# Patient Record
Sex: MALE | Race: WHITE | NOT HISPANIC OR LATINO | Employment: OTHER | URBAN - METROPOLITAN AREA
[De-identification: names, ages, dates, MRNs, and addresses within clinical notes are randomized per-mention and may not be internally consistent; named-entity substitution may affect disease eponyms.]

---

## 2022-05-20 ENCOUNTER — HOSPITAL ENCOUNTER (INPATIENT)
Facility: MEDICAL CENTER | Age: 67
LOS: 4 days | DRG: 552 | End: 2022-05-24
Attending: EMERGENCY MEDICINE | Admitting: SURGERY
Payer: MEDICARE

## 2022-05-20 ENCOUNTER — HOSPITAL ENCOUNTER (OUTPATIENT)
Dept: RADIOLOGY | Facility: MEDICAL CENTER | Age: 67
End: 2022-05-20

## 2022-05-20 DIAGNOSIS — T14.90XA TRAUMA: ICD-10-CM

## 2022-05-20 DIAGNOSIS — S06.0X1A CLOSED HEAD INJURY WITH CONCUSSION, WITH LOSS OF CONSCIOUSNESS OF 30 MINUTES OR LESS, INITIAL ENCOUNTER: ICD-10-CM

## 2022-05-20 DIAGNOSIS — S22.009A CLOSED FRACTURE OF MULTIPLE THORACIC VERTEBRAE, INITIAL ENCOUNTER (HCC): ICD-10-CM
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
## 2022-05-20 PROBLEM — I10 HYPERTENSION: Status: ACTIVE | Noted: 2022-05-20

## 2022-05-20 PROBLEM — Z53.09 CONTRAINDICATION TO DEEP VEIN THROMBOSIS (DVT) PROPHYLAXIS: Status: ACTIVE | Noted: 2022-05-20

## 2022-05-20 PROBLEM — Z11.52 ENCOUNTER FOR SCREENING FOR COVID-19: Status: ACTIVE | Noted: 2022-05-20

## 2022-05-20 LAB
ABO GROUP BLD: NORMAL
ALBUMIN SERPL BCP-MCNC: 4.4 G/DL (ref 3.2–4.9)
ALBUMIN/GLOB SERPL: 1.7 G/DL
ALP SERPL-CCNC: 57 U/L (ref 30–99)
ALT SERPL-CCNC: 22 U/L (ref 2–50)
ANION GAP SERPL CALC-SCNC: 13 MMOL/L (ref 7–16)
APTT PPP: 28.1 SEC (ref 24.7–36)
AST SERPL-CCNC: 31 U/L (ref 12–45)
BILIRUB SERPL-MCNC: 0.4 MG/DL (ref 0.1–1.5)
BLD GP AB SCN SERPL QL: NORMAL
BUN SERPL-MCNC: 19 MG/DL (ref 8–22)
CALCIUM SERPL-MCNC: 9.6 MG/DL (ref 8.5–10.5)
CHLORIDE SERPL-SCNC: 98 MMOL/L (ref 96–112)
CO2 SERPL-SCNC: 23 MMOL/L (ref 20–33)
CREAT SERPL-MCNC: 0.89 MG/DL (ref 0.5–1.4)
ERYTHROCYTE [DISTWIDTH] IN BLOOD BY AUTOMATED COUNT: 41.3 FL (ref 35.9–50)
ETHANOL BLD-MCNC: <10.1 MG/DL
GFR SERPLBLD CREATININE-BSD FMLA CKD-EPI: 93 ML/MIN/1.73 M 2
GLOBULIN SER CALC-MCNC: 2.6 G/DL (ref 1.9–3.5)
GLUCOSE SERPL-MCNC: 117 MG/DL (ref 65–99)
HCT VFR BLD AUTO: 42 % (ref 42–52)
HGB BLD-MCNC: 14.3 G/DL (ref 14–18)
INR PPP: 1.03 (ref 0.87–1.13)
MCH RBC QN AUTO: 30 PG (ref 27–33)
MCHC RBC AUTO-ENTMCNC: 34 G/DL (ref 33.7–35.3)
MCV RBC AUTO: 88.1 FL (ref 81.4–97.8)
PLATELET # BLD AUTO: 250 K/UL (ref 164–446)
PMV BLD AUTO: 9.8 FL (ref 9–12.9)
POTASSIUM SERPL-SCNC: 4 MMOL/L (ref 3.6–5.5)
PROT SERPL-MCNC: 7 G/DL (ref 6–8.2)
PROTHROMBIN TIME: 13.2 SEC (ref 12–14.6)
RBC # BLD AUTO: 4.77 M/UL (ref 4.7–6.1)
RH BLD: NORMAL
SODIUM SERPL-SCNC: 134 MMOL/L (ref 135–145)
WBC # BLD AUTO: 16.7 K/UL (ref 4.8–10.8)

## 2022-05-20 PROCEDURE — 82077 ASSAY SPEC XCP UR&BREATH IA: CPT

## 2022-05-20 PROCEDURE — 85610 PROTHROMBIN TIME: CPT

## 2022-05-20 PROCEDURE — 305948 HCHG GREEN TRAUMA ACT PRE-NOTIFY NO CC

## 2022-05-20 PROCEDURE — 700111 HCHG RX REV CODE 636 W/ 250 OVERRIDE (IP): Performed by: SURGERY

## 2022-05-20 PROCEDURE — 86901 BLOOD TYPING SEROLOGIC RH(D): CPT

## 2022-05-20 PROCEDURE — 80053 COMPREHEN METABOLIC PANEL: CPT

## 2022-05-20 PROCEDURE — 99285 EMERGENCY DEPT VISIT HI MDM: CPT

## 2022-05-20 PROCEDURE — 770001 HCHG ROOM/CARE - MED/SURG/GYN PRIV*

## 2022-05-20 PROCEDURE — 85730 THROMBOPLASTIN TIME PARTIAL: CPT

## 2022-05-20 PROCEDURE — 85027 COMPLETE CBC AUTOMATED: CPT

## 2022-05-20 PROCEDURE — 99222 1ST HOSP IP/OBS MODERATE 55: CPT | Performed by: SURGERY

## 2022-05-20 PROCEDURE — 36415 COLL VENOUS BLD VENIPUNCTURE: CPT

## 2022-05-20 PROCEDURE — 86850 RBC ANTIBODY SCREEN: CPT

## 2022-05-20 PROCEDURE — 86900 BLOOD TYPING SEROLOGIC ABO: CPT

## 2022-05-20 PROCEDURE — 700105 HCHG RX REV CODE 258: Performed by: SURGERY

## 2022-05-20 PROCEDURE — 96374 THER/PROPH/DIAG INJ IV PUSH: CPT

## 2022-05-20 RX ORDER — AMOXICILLIN 250 MG
1 CAPSULE ORAL
Status: DISCONTINUED | OUTPATIENT
Start: 2022-05-20 | End: 2022-05-24 | Stop reason: HOSPADM

## 2022-05-20 RX ORDER — POLYETHYLENE GLYCOL 3350 17 G/17G
1 POWDER, FOR SOLUTION ORAL 2 TIMES DAILY
Status: DISCONTINUED | OUTPATIENT
Start: 2022-05-20 | End: 2022-05-24 | Stop reason: HOSPADM

## 2022-05-20 RX ORDER — ONDANSETRON 2 MG/ML
4 INJECTION INTRAMUSCULAR; INTRAVENOUS EVERY 4 HOURS PRN
Status: DISCONTINUED | OUTPATIENT
Start: 2022-05-20 | End: 2022-05-24 | Stop reason: HOSPADM

## 2022-05-20 RX ORDER — MORPHINE SULFATE 4 MG/ML
2 INJECTION INTRAVENOUS
Status: DISCONTINUED | OUTPATIENT
Start: 2022-05-20 | End: 2022-05-21

## 2022-05-20 RX ORDER — AMOXICILLIN 250 MG
1 CAPSULE ORAL NIGHTLY
Status: DISCONTINUED | OUTPATIENT
Start: 2022-05-20 | End: 2022-05-24 | Stop reason: HOSPADM

## 2022-05-20 RX ORDER — DOCUSATE SODIUM 100 MG/1
100 CAPSULE, LIQUID FILLED ORAL 2 TIMES DAILY
Status: DISCONTINUED | OUTPATIENT
Start: 2022-05-20 | End: 2022-05-24 | Stop reason: HOSPADM

## 2022-05-20 RX ORDER — MORPHINE SULFATE 4 MG/ML
4 INJECTION INTRAVENOUS
Status: DISCONTINUED | OUTPATIENT
Start: 2022-05-20 | End: 2022-05-21

## 2022-05-20 RX ORDER — SODIUM CHLORIDE, SODIUM LACTATE, POTASSIUM CHLORIDE, CALCIUM CHLORIDE 600; 310; 30; 20 MG/100ML; MG/100ML; MG/100ML; MG/100ML
INJECTION, SOLUTION INTRAVENOUS CONTINUOUS
Status: DISCONTINUED | OUTPATIENT
Start: 2022-05-20 | End: 2022-05-21

## 2022-05-20 RX ORDER — ENEMA 19; 7 G/133ML; G/133ML
1 ENEMA RECTAL
Status: DISCONTINUED | OUTPATIENT
Start: 2022-05-20 | End: 2022-05-24 | Stop reason: HOSPADM

## 2022-05-20 RX ORDER — FOSINOPRIL SODIUM 20 MG/1
20 TABLET ORAL
COMMUNITY
Start: 2022-04-08

## 2022-05-20 RX ORDER — LISINOPRIL 20 MG/1
20 TABLET ORAL
Status: DISCONTINUED | OUTPATIENT
Start: 2022-05-21 | End: 2022-05-24 | Stop reason: HOSPADM

## 2022-05-20 RX ORDER — HYDRALAZINE HYDROCHLORIDE 20 MG/ML
20 INJECTION INTRAMUSCULAR; INTRAVENOUS EVERY 6 HOURS PRN
Status: DISCONTINUED | OUTPATIENT
Start: 2022-05-20 | End: 2022-05-24 | Stop reason: HOSPADM

## 2022-05-20 RX ORDER — ONDANSETRON 4 MG/1
4 TABLET, ORALLY DISINTEGRATING ORAL EVERY 4 HOURS PRN
Status: DISCONTINUED | OUTPATIENT
Start: 2022-05-20 | End: 2022-05-24 | Stop reason: HOSPADM

## 2022-05-20 RX ORDER — HYDROCHLOROTHIAZIDE 12.5 MG/1
12.5 TABLET ORAL DAILY
COMMUNITY
Start: 2021-12-02

## 2022-05-20 RX ORDER — BISACODYL 10 MG
10 SUPPOSITORY, RECTAL RECTAL
Status: DISCONTINUED | OUTPATIENT
Start: 2022-05-20 | End: 2022-05-24 | Stop reason: HOSPADM

## 2022-05-20 RX ADMIN — MORPHINE SULFATE 2 MG: 4 INJECTION INTRAVENOUS at 23:05

## 2022-05-20 RX ADMIN — SODIUM CHLORIDE, POTASSIUM CHLORIDE, SODIUM LACTATE AND CALCIUM CHLORIDE: 600; 310; 30; 20 INJECTION, SOLUTION INTRAVENOUS at 23:08

## 2022-05-20 ASSESSMENT — PAIN DESCRIPTION - PAIN TYPE: TYPE: ACUTE PAIN

## 2022-05-20 ASSESSMENT — FIBROSIS 4 INDEX: FIB4 SCORE: 1.22

## 2022-05-21 LAB — ABO + RH BLD: NORMAL

## 2022-05-21 PROCEDURE — 700102 HCHG RX REV CODE 250 W/ 637 OVERRIDE(OP)

## 2022-05-21 PROCEDURE — A9270 NON-COVERED ITEM OR SERVICE: HCPCS

## 2022-05-21 PROCEDURE — 700111 HCHG RX REV CODE 636 W/ 250 OVERRIDE (IP): Performed by: SURGERY

## 2022-05-21 PROCEDURE — 770001 HCHG ROOM/CARE - MED/SURG/GYN PRIV*

## 2022-05-21 PROCEDURE — A9270 NON-COVERED ITEM OR SERVICE: HCPCS | Performed by: SURGERY

## 2022-05-21 PROCEDURE — 96376 TX/PRO/DX INJ SAME DRUG ADON: CPT

## 2022-05-21 PROCEDURE — 36415 COLL VENOUS BLD VENIPUNCTURE: CPT

## 2022-05-21 PROCEDURE — 700101 HCHG RX REV CODE 250

## 2022-05-21 PROCEDURE — 700102 HCHG RX REV CODE 250 W/ 637 OVERRIDE(OP): Performed by: SURGERY

## 2022-05-21 RX ORDER — LIDOCAINE 50 MG/G
1 PATCH TOPICAL EVERY 24 HOURS
Status: DISCONTINUED | OUTPATIENT
Start: 2022-05-21 | End: 2022-05-24 | Stop reason: HOSPADM

## 2022-05-21 RX ORDER — METHOCARBAMOL 750 MG/1
750 TABLET, FILM COATED ORAL 3 TIMES DAILY
Status: DISCONTINUED | OUTPATIENT
Start: 2022-05-21 | End: 2022-05-22

## 2022-05-21 RX ORDER — HYDROCHLOROTHIAZIDE 12.5 MG/1
12.5 TABLET ORAL DAILY
Status: DISCONTINUED | OUTPATIENT
Start: 2022-05-21 | End: 2022-05-24 | Stop reason: HOSPADM

## 2022-05-21 RX ORDER — OXYCODONE HYDROCHLORIDE 5 MG/1
5 TABLET ORAL EVERY 4 HOURS PRN
Status: DISCONTINUED | OUTPATIENT
Start: 2022-05-21 | End: 2022-05-24 | Stop reason: HOSPADM

## 2022-05-21 RX ORDER — OXYCODONE HYDROCHLORIDE 10 MG/1
10 TABLET ORAL EVERY 4 HOURS PRN
Status: DISCONTINUED | OUTPATIENT
Start: 2022-05-21 | End: 2022-05-24 | Stop reason: HOSPADM

## 2022-05-21 RX ADMIN — METHOCARBAMOL 750 MG: 750 TABLET ORAL at 13:47

## 2022-05-21 RX ADMIN — MORPHINE SULFATE 4 MG: 4 INJECTION INTRAVENOUS at 08:38

## 2022-05-21 RX ADMIN — MORPHINE SULFATE 2 MG: 4 INJECTION INTRAVENOUS at 01:45

## 2022-05-21 RX ADMIN — DOCUSATE SODIUM 100 MG: 100 CAPSULE, LIQUID FILLED ORAL at 17:04

## 2022-05-21 RX ADMIN — LIDOCAINE 1 PATCH: 50 PATCH TOPICAL at 13:03

## 2022-05-21 RX ADMIN — MORPHINE SULFATE 2 MG: 4 INJECTION INTRAVENOUS at 03:57

## 2022-05-21 RX ADMIN — OXYCODONE 5 MG: 5 TABLET ORAL at 17:04

## 2022-05-21 RX ADMIN — HYDROCHLOROTHIAZIDE 12.5 MG: 12.5 TABLET ORAL at 13:02

## 2022-05-21 RX ADMIN — MORPHINE SULFATE 2 MG: 4 INJECTION INTRAVENOUS at 06:07

## 2022-05-21 RX ADMIN — SENNOSIDES AND DOCUSATE SODIUM 1 TABLET: 50; 8.6 TABLET ORAL at 06:05

## 2022-05-21 RX ADMIN — METHOCARBAMOL 750 MG: 750 TABLET ORAL at 17:54

## 2022-05-21 RX ADMIN — LISINOPRIL 20 MG: 20 TABLET ORAL at 06:04

## 2022-05-21 RX ADMIN — OXYCODONE HYDROCHLORIDE 10 MG: 10 TABLET ORAL at 21:28

## 2022-05-21 RX ADMIN — OXYCODONE HYDROCHLORIDE 10 MG: 10 TABLET ORAL at 13:02

## 2022-05-21 RX ADMIN — SENNOSIDES AND DOCUSATE SODIUM 1 TABLET: 50; 8.6 TABLET ORAL at 21:28

## 2022-05-21 RX ADMIN — POLYETHYLENE GLYCOL 3350 1 PACKET: 17 POWDER, FOR SOLUTION ORAL at 17:04

## 2022-05-21 RX ADMIN — MORPHINE SULFATE 2 MG: 4 INJECTION INTRAVENOUS at 00:23

## 2022-05-21 ASSESSMENT — COGNITIVE AND FUNCTIONAL STATUS - GENERAL
MOVING TO AND FROM BED TO CHAIR: A LOT
WALKING IN HOSPITAL ROOM: A LOT
STANDING UP FROM CHAIR USING ARMS: A LOT
DRESSING REGULAR LOWER BODY CLOTHING: A LOT
DAILY ACTIVITIY SCORE: 20
SUGGESTED CMS G CODE MODIFIER DAILY ACTIVITY: CJ
TURNING FROM BACK TO SIDE WHILE IN FLAT BAD: A LOT
MOBILITY SCORE: 12
MOVING FROM LYING ON BACK TO SITTING ON SIDE OF FLAT BED: A LOT
CLIMB 3 TO 5 STEPS WITH RAILING: A LOT
DRESSING REGULAR UPPER BODY CLOTHING: A LITTLE
HELP NEEDED FOR BATHING: A LITTLE
SUGGESTED CMS G CODE MODIFIER MOBILITY: CL

## 2022-05-21 ASSESSMENT — COPD QUESTIONNAIRES
HAVE YOU SMOKED AT LEAST 100 CIGARETTES IN YOUR ENTIRE LIFE: NO/DON'T KNOW
DO YOU EVER COUGH UP ANY MUCUS OR PHLEGM?: NO/ONLY WITH OCCASIONAL COLDS OR INFECTIONS
COPD SCREENING SCORE: 2
DURING THE PAST 4 WEEKS HOW MUCH DID YOU FEEL SHORT OF BREATH: NONE/LITTLE OF THE TIME

## 2022-05-21 ASSESSMENT — ENCOUNTER SYMPTOMS
MYALGIAS: 0
RESPIRATORY NEGATIVE: 1
BLURRED VISION: 0
DIAPHORESIS: 0
PHOTOPHOBIA: 0
PSYCHIATRIC NEGATIVE: 1
WEAKNESS: 0
SENSORY CHANGE: 0
FOCAL WEAKNESS: 0
DIZZINESS: 0
PALPITATIONS: 0
CONSTITUTIONAL NEGATIVE: 1
HEADACHES: 0
ABDOMINAL PAIN: 0
VOMITING: 0
CHILLS: 0
NECK PAIN: 0
SHORTNESS OF BREATH: 0
DOUBLE VISION: 0
TINGLING: 0
CARDIOVASCULAR NEGATIVE: 1
EYES NEGATIVE: 1
GASTROINTESTINAL NEGATIVE: 1
COUGH: 0
NEUROLOGICAL NEGATIVE: 1
FEVER: 0
SPEECH CHANGE: 0
NAUSEA: 0
BACK PAIN: 1

## 2022-05-21 ASSESSMENT — PAIN DESCRIPTION - PAIN TYPE
TYPE: ACUTE PAIN
TYPE: ACUTE PAIN;SURGICAL PAIN
TYPE: ACUTE PAIN

## 2022-05-21 ASSESSMENT — LIFESTYLE VARIABLES
ALCOHOL_USE: YES
TOTAL SCORE: 0
HAVE PEOPLE ANNOYED YOU BY CRITICIZING YOUR DRINKING: NO
EVER FELT BAD OR GUILTY ABOUT YOUR DRINKING: NO
HOW MANY TIMES IN THE PAST YEAR HAVE YOU HAD 5 OR MORE DRINKS IN A DAY: 0
AVERAGE NUMBER OF DAYS PER WEEK YOU HAVE A DRINK CONTAINING ALCOHOL: 3
EVER HAD A DRINK FIRST THING IN THE MORNING TO STEADY YOUR NERVES TO GET RID OF A HANGOVER: NO
HAVE YOU EVER FELT YOU SHOULD CUT DOWN ON YOUR DRINKING: NO
ON A TYPICAL DAY WHEN YOU DRINK ALCOHOL HOW MANY DRINKS DO YOU HAVE: 3
TOTAL SCORE: 0
TOTAL SCORE: 0
DOES PATIENT WANT TO STOP DRINKING: NO
CONSUMPTION TOTAL: NEGATIVE

## 2022-05-21 ASSESSMENT — PATIENT HEALTH QUESTIONNAIRE - PHQ9
SUM OF ALL RESPONSES TO PHQ9 QUESTIONS 1 AND 2: 0
2. FEELING DOWN, DEPRESSED, IRRITABLE, OR HOPELESS: NOT AT ALL
1. LITTLE INTEREST OR PLEASURE IN DOING THINGS: NOT AT ALL

## 2022-05-21 NOTE — ED PROVIDER NOTES
ED Provider Note     Scribed for Aury Denny D.O. by Adrian Scott. 5/20/2022, 10:07 PM.     Primary care provider: Pcp Pt States None  Means of arrival: EMS         History obtained from: EMS  History limited by: None    CHIEF COMPLAINT  Chief Complaint   Patient presents with   • Trauma Green     [place] [facility] [place]. +LOC. -Thinners. Found to have fractures of T4,5,8,12.        HPI  Kleber Loco is a 67 y.o. male who presents to the emergency Department as a trauma green onset prior to arrival. EMS states that the patient fell off of a horse at 5:30 PM today before being [place] [facility] [place] later. The patient was transferred from this facility after imaging revealed a T-spine fracture at T-spine 4,5,8, and 12. He notes that his initial pain level was a 7/10 before being medicated with Dilaudid 0.5 mg, Fentanyl 50 mcg, and Morphine 8 mg with moderate alleviation bringing his pain to a 3/10. Additionally, EMS medicated the patient with Fentanyl 15 mcg prior to arriving to the ED. Associated symptoms include loss of consciousness and back pain. The patient denies any loss of sensation to bilateral lower extremities. He notes a history of hypertension for which he is compliant with daily Lisinopril. No exacerbating factors noted.     REVIEW OF SYSTEMS  Pertinent positives include trauma green, loss of consciousness, and back pain. Pertinent negatives include no loss of sensation to bilateral lower extremities.   See HPI for further details. All other systems are negative.    PAST MEDICAL HISTORY  Past Medical History:   Diagnosis Date   • Hypertension        FAMILY HISTORY  No family history pertinent.    SOCIAL HISTORY  Social History     Tobacco Use   • Smoking status: Never Smoker   • Smokeless tobacco: Never Used   Vaping Use   • Vaping Use: Never used   Substance Use Topics   • Alcohol use: Yes     Comment: occ   • Drug use:  No      Social History     Substance and Sexual Activity   Drug Use No       SURGICAL HISTORY  No past surgical history noted.    CURRENT MEDICATIONS    Current Facility-Administered Medications:   •  Respiratory Therapy Consult, , Nebulization, Continuous RT, Ava Martinez M.D.  •  Pharmacy Consult Request ...Pain Management Review 1 Each, 1 Each, Other, PHARMACY TO DOSE, Ava Martinez M.D.  •  ondansetron (ZOFRAN) syringe/vial injection 4 mg, 4 mg, Intravenous, Q4HRS PRN, Ava Martinez M.D.  •  ondansetron (ZOFRAN ODT) dispertab 4 mg, 4 mg, Oral, Q4HRS PRN, Ava Martinez M.D.  •  docusate sodium (COLACE) capsule 100 mg, 100 mg, Oral, BID, Ava Martinez M.D.  •  senna-docusate (PERICOLACE or SENOKOT S) 8.6-50 MG per tablet 1 Tablet, 1 Tablet, Oral, Nightly, Ava Martinez M.D.  •  senna-docusate (PERICOLACE or SENOKOT S) 8.6-50 MG per tablet 1 Tablet, 1 Tablet, Oral, Q24HRS PRN, Ava Martinez M.D.  •  polyethylene glycol/lytes (MIRALAX) PACKET 1 Packet, 1 Packet, Oral, BID, Ava Martinez M.D.  •  magnesium hydroxide (MILK OF MAGNESIA) suspension 30 mL, 30 mL, Oral, DAILY, Ava Martinez M.D.  •  bisacodyl (DULCOLAX) suppository 10 mg, 10 mg, Rectal, Q24HRS PRN, Ava Martinez M.D.  •  sodium phosphate (Fleet) enema 133 mL, 1 Each, Rectal, Once PRN, Ava Martinez M.D.  •  LR infusion, , Intravenous, Continuous, Ava Martinez M.D., Last Rate: 125 mL/hr at 05/20/22 2308, New Bag at 05/20/22 2308  •  morphine 4 MG/ML injection 2 mg, 2 mg, Intravenous, Q HOUR PRN, 2 mg at 05/21/22 0145 **OR** morphine 4 MG/ML injection 4 mg, 4 mg, Intravenous, Q HOUR PRN, Ava Martinez M.D.  •  lisinopril (PRINIVIL) tablet 20 mg, 20 mg, Oral, Q DAY, Ava Martinez M.D.  •  hydrALAZINE (APRESOLINE) injection 20 mg, 20 mg, Intravenous, Q6HRS PRN, Ava Martinez M.D.    Current Outpatient Medications:   •  hydroCHLOROthiazide (HYDRODIURIL) 12.5 MG tablet, Take 12.5 mg by mouth every day., Disp: , Rfl:   •   "fosinopril (MONOPRIL) 20 MG Tab, Take 20 mg by mouth every day., Disp: , Rfl:     ALLERGIES  No Known Allergies    PHYSICAL EXAM  VITAL SIGNS: BP (!) 185/107   Pulse (!) 103   Temp 37.1 °C (98.8 °F) (Temporal)   Resp 16   Ht 1.854 m (6' 1\")   Wt 81.6 kg (180 lb)   SpO2 96%   BMI 23.75 kg/m²     Constitutional: Patient is well developed, well nourished.  Moderate distress with any type of movement.  HENT: Normocephalic, atraumatic. . Nose normal with no mucosal edema or drainage. Oropharynx moist without erythema or exudates.  Eyes: PERRL, EOMI, Conjunctiva without subconjunctival hemorrhage.  Neck: Supple  Normal range of motion in flexion, extension and lateral rotation. No tenderness along the bony prominences or paraspinal muscles.   Lymphatic: No lymphadenopathy noted.   Cardiovascular: Tachycardic and Regular rhythm. No murmur  Thorax & Lungs: Clear and equal breath sounds with good excursion. No respiratory distress, no rhonchi, wheezing or rales. No chest tenderness or signs of trauma . Midline T-Spine tenderness  Abdomen: Bowel sounds normal in all four quadrants. Soft, nontender, no rebound , guarding, palpable masses.   Skin: Warm, Dry  Back: No cervical or lumbosacral tenderness. No CVA tenderness.   Extremities: Peripheral pulses 4/4 no tenderness, normal range of motion. Equal movement of toes bilaterally   Musculoskeletal: Normal range of motion in all major joints. No tenderness to palpation or major deformities noted.   Neurologic: Alert & oriented x 3, Normal motor function, Normal sensory function, No lateralizing or focal deficits noted. DTR's 4/4 bilaterally.  Psychiatric: Affect normal, Judgment normal, Mood normal.     DIAGNOSTICS/PROCEDURES    LABS  Results for orders placed or performed during the hospital encounter of 05/20/22   DIAGNOSTIC ALCOHOL   Result Value Ref Range    Diagnostic Alcohol <10.1 <10.1 mg/dL   CBC WITHOUT DIFFERENTIAL   Result Value Ref Range    WBC 16.7 (H) 4.8 - " 10.8 K/uL    RBC 4.77 4.70 - 6.10 M/uL    Hemoglobin 14.3 14.0 - 18.0 g/dL    Hematocrit 42.0 42.0 - 52.0 %    MCV 88.1 81.4 - 97.8 fL    MCH 30.0 27.0 - 33.0 pg    MCHC 34.0 33.7 - 35.3 g/dL    RDW 41.3 35.9 - 50.0 fL    Platelet Count 250 164 - 446 K/uL    MPV 9.8 9.0 - 12.9 fL   Comp Metabolic Panel   Result Value Ref Range    Sodium 134 (L) 135 - 145 mmol/L    Potassium 4.0 3.6 - 5.5 mmol/L    Chloride 98 96 - 112 mmol/L    Co2 23 20 - 33 mmol/L    Anion Gap 13.0 7.0 - 16.0    Glucose 117 (H) 65 - 99 mg/dL    Bun 19 8 - 22 mg/dL    Creatinine 0.89 0.50 - 1.40 mg/dL    Calcium 9.6 8.5 - 10.5 mg/dL    AST(SGOT) 31 12 - 45 U/L    ALT(SGPT) 22 2 - 50 U/L    Alkaline Phosphatase 57 30 - 99 U/L    Total Bilirubin 0.4 0.1 - 1.5 mg/dL    Albumin 4.4 3.2 - 4.9 g/dL    Total Protein 7.0 6.0 - 8.2 g/dL    Globulin 2.6 1.9 - 3.5 g/dL    A-G Ratio 1.7 g/dL   Prothrombin Time   Result Value Ref Range    PT 13.2 12.0 - 14.6 sec    INR 1.03 0.87 - 1.13   APTT   Result Value Ref Range    APTT 28.1 24.7 - 36.0 sec   COD - Adult (Type and Screen)   Result Value Ref Range    ABO Grouping Only O     Rh Grouping Only POS     Antibody Screen-Cod NEG    ABO Rh Confirm   Result Value Ref Range    ABO Rh Confirm O POS    ESTIMATED GFR   Result Value Ref Range    GFR (CKD-EPI) 93 >60 mL/min/1.73 m 2       Labs reviewed by me    RADIOLOGY/PROCEDURES  OUTSIDE IMAGES-CT LUMBAR SPINE   Final Result      OUTSIDE IMAGES-CT THORACIC SPINE   Final Result      OUTSIDE IMAGES-CT ABDOMEN /PELVIS   Final Result      OUTSIDE IMAGES-CT CHEST   Final Result      OUTSIDE IMAGES-CT CERVICAL SPINE   Final Result      OUTSIDE IMAGES-CT HEAD   Final Result        Results and radiologist interpretation reviewed by me.     COURSE & MEDICAL DECISION MAKING  Pertinent Labs & Imaging studies reviewed. (See chart for details)    10:07 PM - Patient seen and evaluated at bedside. Ordered for COD-Adult, APTT, Prothrombin Time, CMP, CBC with diff, and Diagnostic  Alcohol to evaluate. . Discussed utilizing labs to further evaluate the patient as well as consulting with Spine, the patient is amenable to the plan of care.     10:19 PM - Paged Spine    10:23 PM I discussed the patient's case and the above findings with Dr. Olson (Spine) who will evaluate outside imaging and relay any additional imaging he would like.   Spoke with trauma services and patient will be admitted by Dr. Martinez with plans for spine to see him in the morning.  He was treated for pain.  He is currently in guarded condition      DISPOSITION:  Patient will be hospitalized by Dr. Martinez in guarded condition.      FINAL IMPRESSION  1. Animal-rider injured by fall from or being thrown from horse in noncollision accident, initial encounter    2. Closed fracture of multiple thoracic vertebrae, initial encounter ([facility])    3. Closed head injury with concussion, with loss of consciousness of 30 minutes or less, initial encounter         Adrian WINKLER (Scribe), am scribing for, and in the presence of, Aury Denny D.O..    Electronically signed by: Adrian Scott (Scribe), 5/20/2022    IAury D.O. personally performed the services described in this documentation, as scribed by Adrian Scott in my presence, and it is both accurate and complete.    The note accurately reflects work and decisions made by me.  Aury Denny D.O.  5/21/2022  4:28 AM

## 2022-05-21 NOTE — ASSESSMENT & PLAN NOTE
Bucked off a horse. Positive loss of consciousness.  Trauma Green Transfer Activation.  Ava Martinez MD. Trauma Surgery.

## 2022-05-21 NOTE — PROGRESS NOTES
Trauma / Surgical Daily Progress Note    Date of Service  5/21/2022    Chief Complaint  67 y.o. male admitted 5/20/2022 with thoracic spine fractures after getting bucked off a horse.    Interval Events  Awaiting everett bed in ER.  Tiertiary exam completed, no significant findings.    -Admit to Ortho, GSU or Neuro wards  -Restart home hypertension meds  -Titrate down oxygen with goal to sustain room air (patient's baseline)  -Mobilize as tolerated with staff & therapy  -Disposition: pending OT & PT recs    Review of Systems  Review of Systems   Constitutional: Positive for malaise/fatigue. Negative for chills, diaphoresis and fever.   HENT: Negative for ear discharge, ear pain, hearing loss and tinnitus.    Eyes: Negative for blurred vision, double vision and photophobia.   Respiratory: Negative for cough and shortness of breath.    Cardiovascular: Negative for chest pain and palpitations.   Gastrointestinal: Negative for abdominal pain, nausea and vomiting.   Genitourinary: Negative.    Musculoskeletal: Positive for back pain. Negative for joint pain, myalgias and neck pain.   Skin: Negative.    Neurological: Negative for dizziness, tingling, sensory change, speech change, focal weakness, weakness and headaches.      Vital Signs  Temp:  [37.1 °C (98.7 °F)-37.1 °C (98.8 °F)] 37.1 °C (98.8 °F)  Pulse:  [] 86  Resp:  [11-19] 12  BP: (144-193)/() 155/87  SpO2:  [90 %-99 %] 97 %    Physical Exam  Physical Exam  Constitutional:       General: He is not in acute distress.     Appearance: He is not ill-appearing.      Interventions: Nasal cannula in place.   HENT:      Head: Normocephalic and atraumatic.      Right Ear: External ear normal.      Left Ear: External ear normal.      Nose: Nose normal.      Mouth/Throat:      Mouth: Mucous membranes are moist.      Pharynx: Oropharynx is clear.   Eyes:      Extraocular Movements: Extraocular movements intact.      Pupils: Pupils are equal, round, and reactive to  light.   Cardiovascular:      Rate and Rhythm: Normal rate and regular rhythm.      Pulses: Normal pulses.      Heart sounds: Normal heart sounds.   Pulmonary:      Effort: Pulmonary effort is normal. No respiratory distress.      Breath sounds: Normal breath sounds.   Chest:      Chest wall: No tenderness.   Abdominal:      General: Bowel sounds are normal. There is no distension.      Tenderness: There is no abdominal tenderness.   Musculoskeletal:         General: No swelling, tenderness or deformity.      Cervical back: Normal range of motion and neck supple. No tenderness.      Right lower leg: No edema.      Left lower leg: No edema.   Skin:     General: Skin is warm and dry.      Capillary Refill: Capillary refill takes less than 2 seconds.   Neurological:      General: No focal deficit present.      Mental Status: He is alert and oriented to person, place, and time. Mental status is at baseline.      GCS: GCS eye subscore is 4. GCS verbal subscore is 5. GCS motor subscore is 6.      Sensory: No sensory deficit.      Motor: No weakness.      Comments: 5/5 strength bilateral upper & lower extremities   Psychiatric:         Mood and Affect: Mood normal.         Behavior: Behavior normal.         Laboratory  Recent Results (from the past 24 hour(s))   CBC WITH DIFFERENTIAL    Collection Time: 05/20/22  6:30 PM   Result Value Ref Range    WBC 10.3 4.8 - 10.8 K/uL    RBC 4.95 4.70 - 6.10 M/uL    Hemoglobin 14.6 14.0 - 18.0 g/dL    Hematocrit 43.3 42.0 - 52.0 %    MCV 87.5 80.0 - 94.0 fL    MCH 29.5 27.0 - 31.0 pg    MCHC 33.7 33.0 - 37.0 g/dL    RDW 12.8 11.5 - 14.5 %    Platelet Count 272 130 - 400 K/uL    MPV 10.0 7.4 - 10.4 fL    Neutrophils Automated 62.6 39.0 - 70.0 %    Lymphocytes Automated 24.7 21.0 - 50.0 %    Monocytes Automated 8.5 2.0 - 9.0 %    Eosinophils Automated 1.6 0.0 - 5.0 %    Basophils Automated 0.7 0.0 - 3.0 %    Abs Neutrophils Automated 6.5 1.8 - 7.7 K/uL    Abs Lymph Automated 2.5 1.2 -  4.8 K/uL    Eosinophil Count, Blood 0.16 0.00 - 0.50 K/uL   Comp Metabolic Panel    Collection Time: 05/20/22  6:30 PM   Result Value Ref Range    Sodium 137 136 - 145 mmol/L    Potassium 3.5 3.5 - 5.1 mmol/L    Chloride 99 98 - 107 mmol/L    Co2 26 21 - 32 mmol/L    Anion Gap 16 10 - 18 mmol/L    Glucose 102 (H) 74 - 99 mg/dL    Bun 19 (H) 7 - 18 mg/dL    Creatinine 1.2 0.8 - 1.3 mg/dL    Calcium 10.4 8.5 - 11.0 mg/dL    AST(SGOT) 28 15 - 37 U/L    ALT(SGPT) 33 12 - 78 U/L    Alkaline Phosphatase 66 46 - 116 U/L    Total Bilirubin 0.5 0.2 - 1.0 mg/dL    Albumin 4.5 3.4 - 5.0 g/dL    Total Protein 7.7 6.4 - 8.2 g/dL    A-G Ratio 1.4    Prothrombin Time    Collection Time: 05/20/22  6:30 PM   Result Value Ref Range    PT 9.4 9.3 - 11.7 sec    INR 0.87    APTT    Collection Time: 05/20/22  6:30 PM   Result Value Ref Range    APTT 22.1 (L) 22.8 - 31.5 sec   TROPONIN    Collection Time: 05/20/22  6:30 PM   Result Value Ref Range    Troponin I 4.2 0.0 - 60.3 pg/mL   ESTIMATED GFR    Collection Time: 05/20/22  6:30 PM   Result Value Ref Range    GFR (CKD-EPI) 66 >60 mL/min/1.73 m 2   DIAGNOSTIC ALCOHOL    Collection Time: 05/20/22  6:30 PM   Result Value Ref Range    Ethyl Alcohol -Ethanol  Etoh 8 0 - 10 mg/dL   SARS-COV Antigen HAYLEY    Collection Time: 05/20/22  8:30 PM   Result Value Ref Range    SARS-CoV-2 Source NP Swab     SARS-COV ANTIGEN HAYLEY NotDetected Not-Detected   DIAGNOSTIC ALCOHOL    Collection Time: 05/20/22 10:08 PM   Result Value Ref Range    Diagnostic Alcohol <10.1 <10.1 mg/dL   CBC WITHOUT DIFFERENTIAL    Collection Time: 05/20/22 10:08 PM   Result Value Ref Range    WBC 16.7 (H) 4.8 - 10.8 K/uL    RBC 4.77 4.70 - 6.10 M/uL    Hemoglobin 14.3 14.0 - 18.0 g/dL    Hematocrit 42.0 42.0 - 52.0 %    MCV 88.1 81.4 - 97.8 fL    MCH 30.0 27.0 - 33.0 pg    MCHC 34.0 33.7 - 35.3 g/dL    RDW 41.3 35.9 - 50.0 fL    Platelet Count 250 164 - 446 K/uL    MPV 9.8 9.0 - 12.9 fL   Comp Metabolic Panel    Collection Time:  05/20/22 10:08 PM   Result Value Ref Range    Sodium 134 (L) 135 - 145 mmol/L    Potassium 4.0 3.6 - 5.5 mmol/L    Chloride 98 96 - 112 mmol/L    Co2 23 20 - 33 mmol/L    Anion Gap 13.0 7.0 - 16.0    Glucose 117 (H) 65 - 99 mg/dL    Bun 19 8 - 22 mg/dL    Creatinine 0.89 0.50 - 1.40 mg/dL    Calcium 9.6 8.5 - 10.5 mg/dL    AST(SGOT) 31 12 - 45 U/L    ALT(SGPT) 22 2 - 50 U/L    Alkaline Phosphatase 57 30 - 99 U/L    Total Bilirubin 0.4 0.1 - 1.5 mg/dL    Albumin 4.4 3.2 - 4.9 g/dL    Total Protein 7.0 6.0 - 8.2 g/dL    Globulin 2.6 1.9 - 3.5 g/dL    A-G Ratio 1.7 g/dL   Prothrombin Time    Collection Time: 05/20/22 10:08 PM   Result Value Ref Range    PT 13.2 12.0 - 14.6 sec    INR 1.03 0.87 - 1.13   APTT    Collection Time: 05/20/22 10:08 PM   Result Value Ref Range    APTT 28.1 24.7 - 36.0 sec   COD - Adult (Type and Screen)    Collection Time: 05/20/22 10:08 PM   Result Value Ref Range    ABO Grouping Only O     Rh Grouping Only POS     Antibody Screen-Cod NEG    ESTIMATED GFR    Collection Time: 05/20/22 10:08 PM   Result Value Ref Range    GFR (CKD-EPI) 93 >60 mL/min/1.73 m 2   ABO Rh Confirm    Collection Time: 05/21/22 12:31 AM   Result Value Ref Range    ABO Rh Confirm O POS        Core Measures & Quality Metrics  Radiology images reviewed, Labs reviewed and Medications reviewed  Morocho catheter: No Morocho      DVT Prophylaxis: Not indicated at this time, ambulatory  DVT prophylaxis - mechanical: SCDs      Assessed for rehab: Patient returned to prior level of function, rehabilitation not indicated at this time    RAP Score Total: 5    Assesment ETOH: BAL negative & CAGE incomplete.      Assessment/Plan  * Closed fracture of thoracic spine without spinal cord lesion, initial encounter ([facility])- (present on admission)  Assessment & Plan  Moderate T4 vertebral body fracture with loss of height anteriorly ~ 50%. No retropulsion.  Acute fracture of the superior endplate of T5 and T8 vertebral body without significant  loss of height. No retropulsion.  Nondisplaced acute fracture of the anterior superior corner of the T12 vertebral body. No loss of height or retropulsion.  Non-operative management.   Sergey Olson MD. Neurosurgeon. [facility] [place].   Follow up with spine surgeon in [place], [place]. Will need repeat xrays in 4-6 weeks.    Contraindication to deep vein thrombosis (DVT) prophylaxis- (present on admission)  Assessment & Plan  Prophylactic anticoagulation for thrombotic prevention initially contraindicated secondary to elevated bleeding risk.  5/22 Trauma surveillance venous duplex scanning ordered.    Encounter for screening for COVID-19- (present on admission)  Assessment & Plan  Fully vaccinated (greater than 2 weeks following the second dose in a 2-dose series or greater than 2 weeks following a single-dose vaccine).    Hypertension- (present on admission)  Assessment & Plan  Chronic condition treated with lisinopril.  Resumed maintenance medication on admission.    Trauma- (present on admission)  Assessment & Plan  Bucked off a horse. Positive loss of consciousness.  Trauma Green Transfer Activation.  Ava Martinez MD. Trauma Surgery.     Mental status adequate for full examination?: Yes    Spine cleared (radiologically and/or clinically): Yes    All current laboratory studies/radiology exams reviewed: Yes    Completed Consultations:  Dr. Sergey Olson, Neurosurgery     Pending Consultations:  None    Newly Identified Diagnoses and Injuries:  None noted at time of exam.      Discussed patient condition with Patient and trauma surgery, Dr Martinez.

## 2022-05-21 NOTE — PROGRESS NOTES
Spoke with Dr Denny regarding T spine fxs  Reviewed T spine CT  All fxs appear minimal and stable  Formal note to follow in AM  No need for MRI  Will eval patient for brace in AM  No spinal precautions necessary  Not planning any surgical intervention

## 2022-05-21 NOTE — PROGRESS NOTES
1309 Report received from Mary Jo, ER RN.    1332 Received patient from ER via bed accompanied by one male transport.    1350 Admit profile completed. Educated on the importance/use of IS at lesat 10x every hour while awake, able to reach 1500. Rates back pain 3/10, heat pack applied to back.  Fall protocol in effect. Omkar light within reach. Reminded patient to call for assist. Q 2 hour turn in effect.  Assessment completed. No distress noted. Plan of care reviewed with the patient. Verbalized understanding.    1530 Patient's in bed. No distress noted. Spouse and daughter visiting.     1704 Medicated with Oxycodone (see MAR) for c/o's back pain, rates pain 6/10.    1900  Patient's in bed. No changes in status. Bedside report given to Oncoming NOC RN (Raiza).

## 2022-05-21 NOTE — ASSESSMENT & PLAN NOTE
Chronic condition treated with lisinopril & hydrochlorothiazide.  Resumed maintenance medication on admission.

## 2022-05-21 NOTE — CONSULTS
Surgery Neurosurgery Consultation    Date of Service  5/21/2022    Referring Physician  Aury Denny D.O.    Consulting Physician  Sergey Olson M.D.    Reason for Consultation  T-spine fracture at T-spine 4,5,8, and 12    History of Presenting Illness  67 y.o. male who presented 5/20/2022 with thoracic spine fractures at T4, T5, T8 and T12 after being involved in a horse riding accident.  Patient was able to ambulate to the car and take a vehicle to the emergency room.  He has been up and ambulatory.  The majority of his pain is intrascapular.  He denies neurologic symptoms in the upper and lower extremities.  I was able to review the CT scan which shows multiple small endplate compression fractures at T4, T5, T8 and T12.  He is being admitted by the trauma service for pain control.  I was contacted regarding this patient by Dr. Denny at 2230 on 5/20 and evaluated the patient at 0530 5/21    Review of Systems  Review of Systems   Constitutional: Negative.    HENT: Negative.    Eyes: Negative.    Respiratory: Negative.    Cardiovascular: Negative.    Gastrointestinal: Negative.    Genitourinary: Negative.    Musculoskeletal: Positive for back pain.   Skin: Negative.    Neurological: Negative.    Endo/Heme/Allergies: Negative.    Psychiatric/Behavioral: Negative.    All other systems reviewed and are negative.      Past Medical History   has a past medical history of Hypertension.    Surgical History   has no past surgical history on file.    Family History  family history is not on file.    Social History   reports that he has never smoked. He has never used smokeless tobacco. He reports current alcohol use. He reports that he does not use drugs.    Medications  Prior to Admission Medications   Prescriptions Last Dose Informant Patient Reported? Taking?   fosinopril (MONOPRIL) 20 MG Tab 5/20/2022 at AM Patient Yes No   Sig: Take 20 mg by mouth every day.   hydroCHLOROthiazide (HYDRODIURIL) 12.5 MG tablet  5/20/2022 at AM Patient Yes Yes   Sig: Take 12.5 mg by mouth every day.      Facility-Administered Medications: None       Allergies  No Known Allergies    Physical Exam  Temp:  [37.1 °C (98.7 °F)-37.1 °C (98.8 °F)] 37.1 °C (98.8 °F)  Pulse:  [] 78  Resp:  [12-18] 16  BP: (144-193)/() 144/94  SpO2:  [90 %-99 %] 99 %    Physical Exam  Vitals and nursing note reviewed.   Constitutional:       Appearance: Normal appearance. He is normal weight.   HENT:      Head: Normocephalic and atraumatic.      Right Ear: Ear canal and external ear normal.      Left Ear: Ear canal and external ear normal.      Nose: Nose normal.      Mouth/Throat:      Mouth: Mucous membranes are moist.      Pharynx: Oropharynx is clear.   Eyes:      Extraocular Movements: Extraocular movements intact.      Conjunctiva/sclera: Conjunctivae normal.      Pupils: Pupils are equal, round, and reactive to light.   Cardiovascular:      Rate and Rhythm: Normal rate and regular rhythm.      Pulses: Normal pulses.      Heart sounds: Normal heart sounds.   Pulmonary:      Effort: Pulmonary effort is normal.      Breath sounds: Normal breath sounds.   Abdominal:      General: Abdomen is flat.      Palpations: Abdomen is soft.   Musculoskeletal:         General: Normal range of motion.      Cervical back: Normal range of motion and neck supple.   Skin:     General: Skin is warm.      Capillary Refill: Capillary refill takes less than 2 seconds.   Neurological:      General: No focal deficit present.      Mental Status: He is alert and oriented to person, place, and time. Mental status is at baseline.      GCS: GCS eye subscore is 4. GCS verbal subscore is 5. GCS motor subscore is 6.      Sensory: Sensation is intact.      Motor: Motor function is intact.   Psychiatric:         Mood and Affect: Mood normal.         Behavior: Behavior normal.         Thought Content: Thought content normal.         Judgment: Judgment normal.          Fluids      Laboratory  Recent Labs     05/20/22 1830 05/20/22 2208   WBC 10.3 16.7*   RBC 4.95 4.77   HEMOGLOBIN 14.6 14.3   HEMATOCRIT 43.3 42.0   MCV 87.5 88.1   MCH 29.5 30.0   MCHC 33.7 34.0   RDW 12.8 41.3   PLATELETCT 272 250   MPV 10.0 9.8     Recent Labs     05/20/22 1830 05/20/22 2208   SODIUM 137 134*   POTASSIUM 3.5 4.0   CHLORIDE 99 98   CO2 26 23   GLUCOSE 102* 117*   BUN 19* 19   CREATININE 1.2 0.89   CALCIUM 10.4 9.6     Recent Labs     05/20/22 1830 05/20/22 2208   APTT 22.1* 28.1   INR 0.87 1.03                 Imaging  OUTSIDE IMAGES-CT LUMBAR SPINE   Final Result      OUTSIDE IMAGES-CT THORACIC SPINE   Final Result      OUTSIDE IMAGES-CT ABDOMEN /PELVIS   Final Result      OUTSIDE IMAGES-CT CHEST   Final Result      OUTSIDE IMAGES-CT CERVICAL SPINE   Final Result      OUTSIDE IMAGES-CT HEAD   Final Result          Assessment/Plan  The patient's thoracic spine fractures are stable.  I discussed with him and his wife that there is no good bracing options in the upper thoracic spine where the majority of his pain is.  Did discuss the possibility of a TLSO and a  but we have decided against that.  Discussed the natural history of small compression fractures.  Patient does know an orthopedic surgeon in [place] and he will follow-up with him.  Did recommend follow-up x-rays in 4 to 6 weeks.  Did discuss a small chance of progressive height loss, compression which is generally accompanied by increasing pain.  From my perspective, the patient can be discharged by the trauma service when his pain is under control.  No spinal precautions while in the hospital.  Okay for therapies.  Please call with any questions or concerns.

## 2022-05-21 NOTE — H&P
TRAUMA ADMISSION HISTORY AND PHYSICAL    DATE OF ADMISSION:  05/20/2022     IDENTIFICATION:  A 67-year-old male.     HISTORY OF PRESENT ILLNESS:  The patient was in his usual state of health   until today when he was riding a horse especially when it bucked off.  He   denies any loss of consciousness.  He subsequently was evaluated at [facility] where he was found to have multiple thoracic fractures.    He was subsequently transferred to [facility] as a trauma green   transfer.     PAST MEDICAL HISTORY:  Hypertension.     PAST SURGICAL HISTORY:  Hand surgery.     MEDICATIONS:  Lisinopril.     ALLERGIES:  None.     SOCIAL HISTORY:  He denies smoking.  He does drink.     REVIEW OF SYSTEMS:  Not obtained as he is a trauma.     PHYSICAL EXAMINATION:  VITAL SIGNS:  His blood pressure is 180/100, heart rates in the 100s.  GENERAL:  He is alert and cooperative.  HEENT:  Head is without evidence of trauma.  Pupils are 3 mm.  NECK:  Nontender.  CHEST:  Nontender.  ABDOMEN:  Soft.  PELVIS:  Stable.  EXTREMITIES:  Moving all extremities.  He has tenderness in his mid back.     LABORATORY DATA:  Blood work demonstrated a hemoglobin of 14, platelet count   250,000.  Electrolytes are normal.  Blood alcohol level was 0.  INR was 0.8.     DIAGNOSTIC DATA:  Chest x-ray demonstrated no evidence of injury.  Head CT   demonstrated no evidence of injury.  C-spine CT demonstrated no evidence of   injury.  Chest, abdominal, pelvic CT scan demonstrated no intrathoracic or   intraabdominal injuries.  T-spine demonstrates a T4 vertebral body fracture   with a 50% compression, acute fracture of T5, acute fracture of superior   endplate of T8 and also nondisplaced acute fracture of the anterosuperior   corner of T12.     IMPRESSION:  A 67-year-old male status post a fall from a horse with multiple   thoracic fractures.     PLAN:  We will admit him to the [facility]opedic [facility].  He will be seen by Dr. Olson from  neurosurgery in regards to spine fractures.  We will do serial   examinations to rule out any neurologic changes.        ______________________________  MD GAURAV YEUNG/YIMI/PUMA    DD:  05/20/2022 22:35  DT:  05/20/2022 22:53    Job#:  [id number]

## 2022-05-21 NOTE — PROGRESS NOTES
4 Eyes Skin Assessment Completed by DANNIELLE Umana and DANNIELLE Li.    Head Bruising to left eye, abrasions to left side of eye  Ears bruising on behind right ear, abrasions to left ear  Nose  Abrasion to nose  Mouth WDL  Neck WDL  Breast/Chest WDL  Shoulder Blades WDL  Spine WDL  (R) Arm/Elbow/Hand WDL  (L) Arm/Elbow/Hand WDL  Abdomen WDL  Groin WDL  Scrotum/Coccyx/Buttocks WDL  (R) Leg WDL  (L) Leg WDL  (R) Heel/Foot/Toe WDL  (L) Heel/Foot/Toe WDL          Devices In Places Pulse Ox and SCD's, nasal cannula      Interventions In Place NC W/Ear Foams, Pillows and Pressure Redistribution Mattress    Possible Skin Injury No    Pictures Uploaded Into Epic N/A  Wound Consult Placed N/A  RN Wound Prevention Protocol Ordered No

## 2022-05-21 NOTE — ED TRIAGE NOTES
"Chief Complaint   Patient presents with   • Trauma Green     Pt trauma green transfer from [facility]. Pt was bucked off horse. +LOC. -Thinners. Found to have fractures of T4,5,8,12.      Report to [facility].    BP (!) 185/107   Pulse (!) 103   Temp 37.1 °C (98.8 °F) (Temporal)   Resp 16   Ht 1.854 m (6' 1\")   Wt 81.6 kg (180 lb)   SpO2 96%   BMI 23.75 kg/m²     "

## 2022-05-21 NOTE — ASSESSMENT & PLAN NOTE
Prophylactic anticoagulation for thrombotic prevention initially contraindicated secondary to elevated bleeding risk.  5/22 Trauma surveillance venous duplex scanning ordered.   Ambulate TID.

## 2022-05-21 NOTE — ASSESSMENT & PLAN NOTE
Moderate T4 vertebral body fracture with loss of height anteriorly ~ 50%. No retropulsion.  Acute fracture of the superior endplate of T5 and T8 vertebral body without significant loss of height. No retropulsion.  Nondisplaced acute fracture of the anterior superior corner of the T12 vertebral body. No loss of height or retropulsion.  Non-operative management.   No bracing required.  Sergey Olson MD. Neurosurgeon. [facility].   Follow up with spine surgeon in [place], [place]. Will need repeat xrays in 4-6 weeks.

## 2022-05-21 NOTE — DISCHARGE PLANNING
Medical Social Work     The RN advised SW that the pt wife may need a room and she is looking for information on the [employer]. SW met with the pt and his wife and provided the [employer] Answer.To information. SW answered all questions they had, SW will remain available for pt and family support.

## 2022-05-22 PROCEDURE — 700102 HCHG RX REV CODE 250 W/ 637 OVERRIDE(OP)

## 2022-05-22 PROCEDURE — 700102 HCHG RX REV CODE 250 W/ 637 OVERRIDE(OP): Performed by: SURGERY

## 2022-05-22 PROCEDURE — A9270 NON-COVERED ITEM OR SERVICE: HCPCS | Performed by: SURGERY

## 2022-05-22 PROCEDURE — 97165 OT EVAL LOW COMPLEX 30 MIN: CPT

## 2022-05-22 PROCEDURE — 700102 HCHG RX REV CODE 250 W/ 637 OVERRIDE(OP): Performed by: PHYSICIAN ASSISTANT

## 2022-05-22 PROCEDURE — 700101 HCHG RX REV CODE 250

## 2022-05-22 PROCEDURE — 97535 SELF CARE MNGMENT TRAINING: CPT

## 2022-05-22 PROCEDURE — A9270 NON-COVERED ITEM OR SERVICE: HCPCS

## 2022-05-22 PROCEDURE — 97161 PT EVAL LOW COMPLEX 20 MIN: CPT

## 2022-05-22 PROCEDURE — A9270 NON-COVERED ITEM OR SERVICE: HCPCS | Performed by: PHYSICIAN ASSISTANT

## 2022-05-22 PROCEDURE — 770001 HCHG ROOM/CARE - MED/SURG/GYN PRIV*

## 2022-05-22 RX ORDER — GABAPENTIN 100 MG/1
100 CAPSULE ORAL 3 TIMES DAILY
Status: DISCONTINUED | OUTPATIENT
Start: 2022-05-22 | End: 2022-05-24 | Stop reason: HOSPADM

## 2022-05-22 RX ORDER — CELECOXIB 100 MG/1
100 CAPSULE ORAL 2 TIMES DAILY
Status: DISCONTINUED | OUTPATIENT
Start: 2022-05-22 | End: 2022-05-24 | Stop reason: HOSPADM

## 2022-05-22 RX ORDER — METHOCARBAMOL 500 MG/1
1000 TABLET, FILM COATED ORAL 3 TIMES DAILY
Status: DISCONTINUED | OUTPATIENT
Start: 2022-05-22 | End: 2022-05-24 | Stop reason: HOSPADM

## 2022-05-22 RX ADMIN — METHOCARBAMOL 1000 MG: 500 TABLET ORAL at 17:13

## 2022-05-22 RX ADMIN — METHOCARBAMOL 1000 MG: 500 TABLET ORAL at 11:49

## 2022-05-22 RX ADMIN — OXYCODONE HYDROCHLORIDE 10 MG: 10 TABLET ORAL at 08:54

## 2022-05-22 RX ADMIN — MAGNESIUM HYDROXIDE 30 ML: 400 SUSPENSION ORAL at 04:40

## 2022-05-22 RX ADMIN — GABAPENTIN 100 MG: 100 CAPSULE ORAL at 17:13

## 2022-05-22 RX ADMIN — LISINOPRIL 20 MG: 20 TABLET ORAL at 04:40

## 2022-05-22 RX ADMIN — OXYCODONE HYDROCHLORIDE 10 MG: 10 TABLET ORAL at 13:49

## 2022-05-22 RX ADMIN — POLYETHYLENE GLYCOL 3350 1 PACKET: 17 POWDER, FOR SOLUTION ORAL at 17:13

## 2022-05-22 RX ADMIN — GABAPENTIN 100 MG: 100 CAPSULE ORAL at 11:49

## 2022-05-22 RX ADMIN — SENNOSIDES AND DOCUSATE SODIUM 1 TABLET: 50; 8.6 TABLET ORAL at 20:23

## 2022-05-22 RX ADMIN — DOCUSATE SODIUM 100 MG: 100 CAPSULE, LIQUID FILLED ORAL at 17:13

## 2022-05-22 RX ADMIN — POLYETHYLENE GLYCOL 3350 1 PACKET: 17 POWDER, FOR SOLUTION ORAL at 04:39

## 2022-05-22 RX ADMIN — LIDOCAINE 1 PATCH: 50 PATCH TOPICAL at 11:49

## 2022-05-22 RX ADMIN — DOCUSATE SODIUM 100 MG: 100 CAPSULE, LIQUID FILLED ORAL at 04:40

## 2022-05-22 RX ADMIN — OXYCODONE HYDROCHLORIDE 10 MG: 10 TABLET ORAL at 22:09

## 2022-05-22 RX ADMIN — OXYCODONE HYDROCHLORIDE 10 MG: 10 TABLET ORAL at 18:09

## 2022-05-22 RX ADMIN — HYDROCHLOROTHIAZIDE 12.5 MG: 12.5 TABLET ORAL at 04:40

## 2022-05-22 RX ADMIN — CELECOXIB 100 MG: 100 CAPSULE ORAL at 17:13

## 2022-05-22 RX ADMIN — OXYCODONE 5 MG: 5 TABLET ORAL at 04:40

## 2022-05-22 RX ADMIN — METHOCARBAMOL 750 MG: 750 TABLET ORAL at 04:39

## 2022-05-22 ASSESSMENT — COGNITIVE AND FUNCTIONAL STATUS - GENERAL
WALKING IN HOSPITAL ROOM: A LITTLE
MOVING TO AND FROM BED TO CHAIR: UNABLE
CLIMB 3 TO 5 STEPS WITH RAILING: A LITTLE
MOVING FROM LYING ON BACK TO SITTING ON SIDE OF FLAT BED: A LITTLE
HELP NEEDED FOR BATHING: A LITTLE
SUGGESTED CMS G CODE MODIFIER MOBILITY: CL
SUGGESTED CMS G CODE MODIFIER DAILY ACTIVITY: CJ
STANDING UP FROM CHAIR USING ARMS: A LITTLE
DRESSING REGULAR LOWER BODY CLOTHING: A LOT
TURNING FROM BACK TO SIDE WHILE IN FLAT BAD: UNABLE
MOBILITY SCORE: 14
DAILY ACTIVITIY SCORE: 21

## 2022-05-22 ASSESSMENT — GAIT ASSESSMENTS
DISTANCE (FEET): 300
GAIT LEVEL OF ASSIST: SUPERVISED

## 2022-05-22 ASSESSMENT — PAIN DESCRIPTION - PAIN TYPE
TYPE: ACUTE PAIN

## 2022-05-22 ASSESSMENT — ENCOUNTER SYMPTOMS
DIAPHORESIS: 0
NAUSEA: 0
SENSORY CHANGE: 0
COUGH: 0
SHORTNESS OF BREATH: 0
WEAKNESS: 0
BLURRED VISION: 0
HEADACHES: 0
NECK PAIN: 0
TINGLING: 0
MYALGIAS: 0
SPEECH CHANGE: 0
FEVER: 0
ABDOMINAL PAIN: 0
PHOTOPHOBIA: 0
CHILLS: 0
PALPITATIONS: 0
FOCAL WEAKNESS: 0
BACK PAIN: 1
VOMITING: 0
DOUBLE VISION: 0
DIZZINESS: 0

## 2022-05-22 ASSESSMENT — ACTIVITIES OF DAILY LIVING (ADL): TOILETING: INDEPENDENT

## 2022-05-22 NOTE — CARE PLAN
Problem: Pain - Standard  Goal: Alleviation of pain or a reduction in pain to the patient’s comfort goal  Outcome: Progressing  Note: Educated on pain scale. Encouraged to verbalize pain. Will medicate as per MAR.     Problem: Knowledge Deficit - Standard  Goal: Patient and family/care givers will demonstrate understanding of plan of care, disease process/condition, diagnostic tests and medications  Outcome: Progressing  Note: POC discussed with the patient and spouse.  Questions answered. Verbalized understanding.       The patient is Stable - Low risk of patient condition declining or worsening    Shift Goals  Clinical Goals: pain control, safety  Patient Goals: pain control, comfort  Family Goals: pain control, comfort    Progress made toward(s) clinical / shift goals:      Patient is not progressing towards the following goals:

## 2022-05-22 NOTE — PROGRESS NOTES
Trauma / Surgical Daily Progress Note    Date of Service  5/22/2022    Chief Complaint  67 y.o. male admitted 5/20/2022 with thoracic spine fractures after getting bucked off a horse.    Interval Events  Pain control issues.  Therapy notes reviewed.  Tolerating diet, +BM.  O2 by NC 1L, IS 1250 mL.    - Neurontin added, robaxin increased  - Wean oxygen as tolerated  - Ambulate TID  - Probable discharge home tomorrow if pain is controlled and off o2    Review of Systems  Review of Systems   Constitutional: Positive for malaise/fatigue. Negative for chills, diaphoresis and fever.   HENT: Negative for ear discharge, ear pain, hearing loss and tinnitus.    Eyes: Negative for blurred vision, double vision and photophobia.   Respiratory: Negative for cough and shortness of breath.    Cardiovascular: Negative for chest pain and palpitations.   Gastrointestinal: Negative for abdominal pain, nausea and vomiting.   Genitourinary: Negative.    Musculoskeletal: Positive for back pain. Negative for joint pain, myalgias and neck pain.   Skin: Negative.    Neurological: Negative for dizziness, tingling, sensory change, speech change, focal weakness, weakness and headaches.      Vital Signs  Temp:  [36.1 °C (97 °F)-37.1 °C (98.7 °F)] 36.7 °C (98 °F)  Pulse:  [77-93] 77  Resp:  [12-18] 16  BP: (144-156)/() 144/89  SpO2:  [92 %-97 %] 92 %    Physical Exam  Physical Exam  Constitutional:       General: He is not in acute distress.     Appearance: He is not ill-appearing.      Interventions: Nasal cannula in place.   HENT:      Head: Normocephalic and atraumatic.      Right Ear: External ear normal.      Left Ear: External ear normal.      Nose: Nose normal.      Mouth/Throat:      Mouth: Mucous membranes are moist.      Pharynx: Oropharynx is clear.   Eyes:      Extraocular Movements: Extraocular movements intact.      Pupils: Pupils are equal, round, and reactive to light.   Cardiovascular:      Rate and Rhythm: Normal rate  and regular rhythm.      Pulses: Normal pulses.      Heart sounds: Normal heart sounds.   Pulmonary:      Effort: Pulmonary effort is normal. No respiratory distress.      Breath sounds: Normal breath sounds.   Chest:      Chest wall: No tenderness.   Abdominal:      General: Bowel sounds are normal. There is no distension.      Tenderness: There is no abdominal tenderness.   Musculoskeletal:         General: No swelling, tenderness or deformity.      Cervical back: Normal range of motion and neck supple. No tenderness.      Right lower leg: No edema.      Left lower leg: No edema.   Skin:     General: Skin is warm and dry.      Capillary Refill: Capillary refill takes less than 2 seconds.   Neurological:      General: No focal deficit present.      Mental Status: He is alert and oriented to person, place, and time. Mental status is at baseline.      GCS: GCS eye subscore is 4. GCS verbal subscore is 5. GCS motor subscore is 6.      Sensory: No sensory deficit.      Motor: No weakness.      Comments: 5/5 strength bilateral upper & lower extremities   Psychiatric:         Mood and Affect: Mood normal.         Behavior: Behavior normal.         Laboratory  No results found for this or any previous visit (from the past 24 hour(s)).    Core Measures & Quality Metrics  Radiology images reviewed, Labs reviewed and Medications reviewed  Morocho catheter: No Morocho      DVT Prophylaxis: Not indicated at this time, ambulatory  DVT prophylaxis - mechanical: SCDs      Assessed for rehab: Patient returned to prior level of function, rehabilitation not indicated at this time    RAP Score Total: 5    ETOH Screening  CAGE Score: 0  Assessment complete date: 5/22/2022 (BAL negative & CAGE negative)      Assessment/Plan  * Closed fracture of thoracic spine without spinal cord lesion, initial encounter (HCC)- (present on admission)  Assessment & Plan  Moderate T4 vertebral body fracture with loss of height anteriorly ~ 50%. No  retropulsion.  Acute fracture of the superior endplate of T5 and T8 vertebral body without significant loss of height. No retropulsion.  Nondisplaced acute fracture of the anterior superior corner of the T12 vertebral body. No loss of height or retropulsion.  Non-operative management.   No bracing required.  Sergey Olson MD. Neurosurgeon. [facility].   Follow up with spine surgeon in [place], [place]. Will need repeat xrays in 4-6 weeks.    Contraindication to deep vein thrombosis (DVT) prophylaxis- (present on admission)  Assessment & Plan  Prophylactic anticoagulation for thrombotic prevention initially contraindicated secondary to elevated bleeding risk.  5/22 Trauma surveillance venous duplex scanning ordered.   Ambulate TID.    Encounter for screening for COVID-19- (present on admission)  Assessment & Plan  Fully vaccinated (greater than 2 weeks following the second dose in a 2-dose series or greater than 2 weeks following a single-dose vaccine).    Hypertension- (present on admission)  Assessment & Plan  Chronic condition treated with lisinopril & hydrochlorothiazide.  Resumed maintenance medication on admission.    Trauma- (present on admission)  Assessment & Plan  Bucked off a horse. Positive loss of consciousness.  Trauma Green Transfer Activation.  Ava Martinez MD. Trauma Surgery.       Discussed patient condition with Patient and trauma surgery, Dr Martinez.

## 2022-05-22 NOTE — CARE PLAN
The patient is Stable - Low risk of patient condition declining or worsening    Shift Goals  Clinical Goals: pain control, mobility, wean off Oxygen  Patient Goals: pain control, comfort, PT & OT Eval  Family Goals: pain control, comfort    Progress made toward(s) clinical / shift goals:      Patient is not progressing towards the following goals:    Problem: Pain - Standard  Goal: Alleviation of pain or a reduction in pain to the patient’s comfort goal  Outcome: Progressing  Note: Educated on pain scale. Encouraged to verbalize pain. Will medicate as per MAR.     Problem: Knowledge Deficit - Standard  Goal: Patient and family/care givers will demonstrate understanding of plan of care, disease process/condition, diagnostic tests and medications  Outcome: Progressing  Note: POC discussed with the patient and spouse. PT and OT Eval.  Questions answered. Verbalized understanding.

## 2022-05-22 NOTE — CARE PLAN
The patient is Stable - Low risk of patient condition declining or worsening    Shift Goals  Clinical Goals: Pain Control, Mobility  Patient Goals: Mobility  Family Goals: Safety, Comfort    Progress made toward(s) clinical / shift goals:        Problem: Pain - Standard  Goal: Alleviation of pain or a reduction in pain to the patient’s comfort goal  Note: Pain managed with PRN oxy.      Problem: Knowledge Deficit - Standard  Goal: Patient and family/care givers will demonstrate understanding of plan of care, disease process/condition, diagnostic tests and medications  Note: Patient aware of plan of care. Aware of PT/OT in the AM. Has been ambulating with FWW.      Problem: Fall Risk  Goal: Patient will remain free from falls  Note: Moderate fall risk. Bed alarm in place.        Patient is not progressing towards the following goals:

## 2022-05-22 NOTE — PROGRESS NOTES
0700 Patient's in bed. Bedside report received from ETTA Eastman RN at the beginning of the shift.    0854 Patient's sitting up in bed. Spouse visiting.  Educated on the importance/use of IS at lesat 10x every hour while awake, able to reach 1500. Medicated with Oxycodone (see MAR) for c/o's back pain, rates pain 7/10.  Fall protocol in effect. Omkar light within reach. Reminded patient to call for assist. Q 2 hour turn in effect.  Assessment completed. No distress noted. Plan of care reviewed with the patient. Verbalized understanding. Spouse visiting.    0955 MELBA Chun and Bacilio OT worked with the patient.     1048 New order received and acknowledged from ROLANDO Moser (see MAR).    1125 ROLANDO Moser visited. POC discussed with the patient and spouse.    1349 Medicated with Oxycodone (see MAR) for c/o's back pain, rates pain 8/10.    1530 Patient's in bed. No distress noted.    1609 New order received and acknowledged (see MAR).    1755 Patient's sitting up in bed, having Dinner. No distress noted.    1809 Medicated with Oxycodone (see MAR) for c/o's back pain, rates pain 8/10.    1912   Patient's in bed. No changes in status. Bedside report given to Taylor QUILES RN (Raiza).

## 2022-05-22 NOTE — THERAPY
Physical Therapy   Initial Evaluation     Patient Name: Kleber Loco  Age:  67 y.o., Sex:  male  Medical Record #: [id number]  Today's Date: 5/22/2022    Assessment  Patient is a 67 y.o. male admitted following being bucked off a horse. Pt sustained T4, 5, 8, 12 fxs to be managed conservatively. Pt seen for PT evaluation at this time. Discussed good body mechanics to reduce pain and for safe mobility with return home. Educated on appropriate activity progression with return home and improved positioning to assist in pain management. Pt demos xfrs and ambulation without AD without LOB. Able to negotiate steps without difficulty. Pt did require mod A for supine > sit but states will sleep in recliner until pain is more controlled. Pt and spouse report no concerns with return home and pt appears functionally capable at this time. Patient will not be actively followed for physical therapy services, however may be seen if requested by physician for 1 more visit within 30 days to address any discharge or equipment needs.    Plan  Recommend Physical Therapy for Evaluation only   DC Equipment Recommendations: None  Discharge Recommendations: Anticipate that the patient will have no further physical therapy needs after discharge from the hospital        05/22/22 1011   Prior Living Situation   Prior Services None   Housing / Facility 3 Story House   Steps Into Home 5   Steps In Home FOS   Bathroom Set up Walk In Shower   Equipment Owned Tub / Shower Seat   Lives with -  Spouse   Comments spouse present and supportive. pt and spouse plan to stay with dtr upon initial DC before returning to [place]   Prior Level of Functional Mobility   Bed Mobility Independent   Transfer Status Independent   Ambulation Independent   Distance Ambulation (Feet) community distances   Assistive Devices Used None   Stairs Independent   Cognition    Level of Consciousness Alert   Comments pleasant, cooperative, receptive to edu   Balance Assessment    Sitting Balance (Static) Good   Sitting Balance (Dynamic) Good   Standing Balance (Static) Good   Standing Balance (Dynamic) Good   Weight Shift Sitting Good   Weight Shift Standing Good   Comments no AD   Gait Analysis   Gait Level Of Assist Supervised   Assistive Device None   Distance (Feet) 300   # of Stairs Climbed 5   Level of Assist with Stairs Supervised   Weight Bearing Status no restrictions   Comments no LOB, edu sequencing step-to pattern for improved energy conservation and safety, decr pain   Bed Mobility    Supine to Sit Moderate Assist   Comments incr difficulty with log roll from HOB flat, states likely will sleep in recliner.   Functional Mobility   Sit to Stand Supervised

## 2022-05-22 NOTE — THERAPY
Occupational Therapy   Initial Evaluation     Patient Name: Kleber Loco  Age:  67 y.o., Sex:  male  Medical Record #: [id number]  Today's Date: 5/22/2022          Assessment  Patient is 67 y.o. male admitted after being bucked off a horse. Pt sustained multiple throacic fractures due to the incident. Pt states he was independent with functional mobility and ADL prior to admittance. Pt was educated on various adaptive strategies for LE dressing with adaptive equipment. Pt was able to don pants for LE dressing with moderate assist from therapist as well as exhibit good understanding and use of the adaptive equipment provided for LE dressing (reacher, sock aid, long handled shoe horn). Pt was able to mobilize throughout the room and into the hallway with FWW and standby assist during functional mobility. Pt has good support at home from his spouse for any needs during his ADL. Patient will not be actively followed for occupational therapy services at this time, however may be seen if requested by physician for 1 more visit within 30 days to address any discharge or equipment needs.      Plan    Recommend Occupational Therapy for Evaluation only for the following treatments:  Adaptive Equipment and Self Care/Activities of Daily Living.    DC Equipment Recommendations: Other (Comments) (Reacher, sock aid, and long handled shoe horn)  Discharge Recommendations: Anticipate that the patient will have no further occupational therapy needs after discharge from the hospital        Objective       05/22/22 1010   Prior Living Situation   Prior Services None   Housing / Facility 3 Story House   Steps Into Home 6   Steps In Home 15  (10 steps to bedroom, 5 steps to living room)   Bathroom Set up Walk In Shower   Equipment Owned Tub / Shower Seat   Lives with - Patient's Self Care Capacity Spouse   Prior Level of ADL Function   Self Feeding Independent   Grooming / Hygiene Independent   Bathing Independent   Dressing Independent    Toileting Independent   Cognition    Cognition / Consciousness WDL   Level of Consciousness Alert   Active ROM Upper Body   Active ROM Upper Body  WDL   Strength Upper Body   Upper Body Strength  WDL   Balance Assessment   Sitting Balance (Static) Good   Sitting Balance (Dynamic) Good   Standing Balance (Static) Good   Standing Balance (Dynamic) Good   Weight Shift Sitting Good   Weight Shift Standing Good   Bed Mobility    Supine to Sit Moderate Assist   Scooting Moderate Assist   ADL Assessment   Eating Supervision   Grooming Supervision   Lower Body Dressing Moderate Assist   How much help from another person does the patient currently need...   Putting on and taking off regular lower body clothing? 2   Bathing (including washing, rinsing, and drying)? 3   Toileting, which includes using a toilet, bedpan, or urinal? 4   Putting on and taking off regular upper body clothing? 4   Taking care of personal grooming such as brushing teeth? 4   Eating meals? 4   6 Clicks Daily Activity Score 21   Functional Mobility   Sit to Stand Standby Assist   Mobility Mobilized from EOB into hallway and back to chair   Activity Tolerance   Sitting in Chair 10 mins   Sitting Edge of Bed 5 mins   Standing 10 mins   Comments Pt left in chair at end of session   Education Group   Education Provided Role of Occupational Therapist;Activities of Daily Living;Adaptive Equipment   Role of Occupational Therapist Patient Response Patient;Family;Acceptance;Explanation   ADL Patient Response Patient;Family;Acceptance;Explanation;Demonstration;Action Demonstration   Adaptive Equipment Patient Response Patient;Family;Acceptance;Explanation;Demonstration;Action Demonstration   Problem List   Problem List Decreased Functional Mobility;Decreased Activity Tolerance   Anticipated Discharge Equipment and Recommendations   DC Equipment Recommendations Other (Comments)  (Reacher, sock aid, and long handled shoe horn)   Discharge Recommendations  Anticipate that the patient will have no further occupational therapy needs after discharge from the hospital   Interdisciplinary Plan of Care Collaboration   IDT Collaboration with  Nursing;Physical Therapist   Patient Position at End of Therapy Seated;Chair Alarm On;Call Light within Reach;Tray Table within Reach;Phone within Reach;Family / Friend in Room

## 2022-05-23 PROCEDURE — RXMED WILLOW AMBULATORY MEDICATION CHARGE: Performed by: PHYSICIAN ASSISTANT

## 2022-05-23 PROCEDURE — 700102 HCHG RX REV CODE 250 W/ 637 OVERRIDE(OP)

## 2022-05-23 PROCEDURE — 700102 HCHG RX REV CODE 250 W/ 637 OVERRIDE(OP): Performed by: PHYSICIAN ASSISTANT

## 2022-05-23 PROCEDURE — 94760 N-INVAS EAR/PLS OXIMETRY 1: CPT

## 2022-05-23 PROCEDURE — 700102 HCHG RX REV CODE 250 W/ 637 OVERRIDE(OP): Performed by: SURGERY

## 2022-05-23 PROCEDURE — A9270 NON-COVERED ITEM OR SERVICE: HCPCS | Performed by: SURGERY

## 2022-05-23 PROCEDURE — A9270 NON-COVERED ITEM OR SERVICE: HCPCS

## 2022-05-23 PROCEDURE — A9270 NON-COVERED ITEM OR SERVICE: HCPCS | Performed by: PHYSICIAN ASSISTANT

## 2022-05-23 PROCEDURE — 770001 HCHG ROOM/CARE - MED/SURG/GYN PRIV*

## 2022-05-23 RX ORDER — LIDOCAINE 50 MG/G
1 PATCH TOPICAL EVERY 24 HOURS
Qty: 15 PATCH | Refills: 0 | Status: SHIPPED | OUTPATIENT
Start: 2022-05-23 | End: 2022-06-08

## 2022-05-23 RX ORDER — ACETAMINOPHEN 500 MG
1000 TABLET ORAL EVERY 8 HOURS PRN
Status: DISCONTINUED | OUTPATIENT
Start: 2022-05-23 | End: 2022-05-24 | Stop reason: HOSPADM

## 2022-05-23 RX ORDER — OXYCODONE HYDROCHLORIDE 5 MG/1
5 TABLET ORAL EVERY 4 HOURS PRN
Qty: 30 TABLET | Refills: 0 | Status: SHIPPED | OUTPATIENT
Start: 2022-05-23 | End: 2022-05-31

## 2022-05-23 RX ORDER — PSEUDOEPHEDRINE HCL 30 MG
100 TABLET ORAL 2 TIMES DAILY PRN
COMMUNITY
Start: 2022-05-23

## 2022-05-23 RX ORDER — MELOXICAM 7.5 MG/1
7.5 TABLET ORAL
Qty: 30 TABLET | Refills: 0 | Status: SHIPPED | OUTPATIENT
Start: 2022-05-23 | End: 2022-05-24

## 2022-05-23 RX ORDER — GABAPENTIN 100 MG/1
100 CAPSULE ORAL 3 TIMES DAILY
Qty: 45 CAPSULE | Refills: 0 | Status: SHIPPED | OUTPATIENT
Start: 2022-05-23 | End: 2022-06-08

## 2022-05-23 RX ORDER — TIZANIDINE 2 MG/1
2 TABLET ORAL EVERY 6 HOURS PRN
Qty: 40 TABLET | Refills: 0 | Status: SHIPPED | OUTPATIENT
Start: 2022-05-23 | End: 2022-06-03

## 2022-05-23 RX ORDER — ACETAMINOPHEN 500 MG
1000 TABLET ORAL EVERY 8 HOURS PRN
Qty: 30 TABLET | Refills: 0 | COMMUNITY
Start: 2022-05-23

## 2022-05-23 RX ADMIN — GABAPENTIN 100 MG: 100 CAPSULE ORAL at 16:43

## 2022-05-23 RX ADMIN — OXYCODONE HYDROCHLORIDE 10 MG: 10 TABLET ORAL at 06:42

## 2022-05-23 RX ADMIN — POLYETHYLENE GLYCOL 3350 1 PACKET: 17 POWDER, FOR SOLUTION ORAL at 06:41

## 2022-05-23 RX ADMIN — HYDROCHLOROTHIAZIDE 12.5 MG: 12.5 TABLET ORAL at 06:42

## 2022-05-23 RX ADMIN — CELECOXIB 100 MG: 100 CAPSULE ORAL at 06:41

## 2022-05-23 RX ADMIN — METHOCARBAMOL 1000 MG: 500 TABLET ORAL at 06:42

## 2022-05-23 RX ADMIN — LISINOPRIL 20 MG: 20 TABLET ORAL at 06:41

## 2022-05-23 RX ADMIN — CELECOXIB 100 MG: 100 CAPSULE ORAL at 16:43

## 2022-05-23 RX ADMIN — ACETAMINOPHEN 1000 MG: 500 TABLET ORAL at 10:54

## 2022-05-23 RX ADMIN — METHOCARBAMOL 1000 MG: 500 TABLET ORAL at 17:45

## 2022-05-23 RX ADMIN — OXYCODONE HYDROCHLORIDE 10 MG: 10 TABLET ORAL at 02:20

## 2022-05-23 RX ADMIN — GABAPENTIN 100 MG: 100 CAPSULE ORAL at 10:54

## 2022-05-23 RX ADMIN — OXYCODONE HYDROCHLORIDE 10 MG: 10 TABLET ORAL at 21:26

## 2022-05-23 RX ADMIN — DOCUSATE SODIUM 100 MG: 100 CAPSULE, LIQUID FILLED ORAL at 06:41

## 2022-05-23 RX ADMIN — GABAPENTIN 100 MG: 100 CAPSULE ORAL at 06:42

## 2022-05-23 RX ADMIN — OXYCODONE HYDROCHLORIDE 10 MG: 10 TABLET ORAL at 10:54

## 2022-05-23 RX ADMIN — METHOCARBAMOL 1000 MG: 500 TABLET ORAL at 10:54

## 2022-05-23 RX ADMIN — OXYCODONE HYDROCHLORIDE 10 MG: 10 TABLET ORAL at 16:43

## 2022-05-23 ASSESSMENT — ENCOUNTER SYMPTOMS
DIAPHORESIS: 0
PHOTOPHOBIA: 0
BLURRED VISION: 0
FEVER: 0
SHORTNESS OF BREATH: 0
BACK PAIN: 1
ABDOMINAL PAIN: 0
HEADACHES: 0
COUGH: 0
NAUSEA: 0
CHILLS: 0
SPEECH CHANGE: 0
VOMITING: 0
DOUBLE VISION: 0
TINGLING: 0
WEAKNESS: 0
FOCAL WEAKNESS: 0
NECK PAIN: 0
SENSORY CHANGE: 0
PALPITATIONS: 0
MYALGIAS: 0
DIZZINESS: 0

## 2022-05-23 ASSESSMENT — PAIN DESCRIPTION - PAIN TYPE
TYPE: ACUTE PAIN

## 2022-05-23 NOTE — CARE PLAN
Problem: Pain - Standard  Goal: Alleviation of pain or a reduction in pain to the patient’s comfort goal  Outcome: Progressing   Pt medicated for pain as prescribed.  Problem: Knowledge Deficit - Standard  Goal: Patient and family/care givers will demonstrate understanding of plan of care, disease process/condition, diagnostic tests and medications  Outcome: Progressing     Problem: Fall Risk  Goal: Patient will remain free from falls  Outcome: Progressing   The patient is Stable - Low risk of patient condition declining or worsening    Shift Goals  Clinical Goals: Pain Control, Mobility, O2 wean  Patient Goals: Pain Control  Family Goals: pain control, comfort    Progress made toward(s) clinical / shift goals:  Pt medicated for pain as prescribed. Fall precautions in place. Pt ambulating without difficulty.     Patient is not progressing towards the following goals:

## 2022-05-23 NOTE — PROGRESS NOTES
Trauma / Surgical Daily Progress Note    Date of Service  5/23/2022    Chief Complaint  67 y.o. male admitted 5/20/2022 with thoracic spine fractures after getting bucked off a horse.    Interval Events  Pain better controled.  Tolerating diet, +BM.  Intermittently needing o2 to maintain saturations > 90%.  IS 2500 mL.    - Mobilize and wean o2 as tolerated  - Discharge home tomorrow am  - Meds to beds sent    Review of Systems  Review of Systems   Constitutional: Positive for malaise/fatigue. Negative for chills, diaphoresis and fever.   HENT: Negative for ear discharge, ear pain, hearing loss and tinnitus.    Eyes: Negative for blurred vision, double vision and photophobia.   Respiratory: Negative for cough and shortness of breath.    Cardiovascular: Negative for chest pain and palpitations.   Gastrointestinal: Negative for abdominal pain, nausea and vomiting.   Genitourinary: Negative.    Musculoskeletal: Positive for back pain. Negative for joint pain, myalgias and neck pain.   Skin: Negative.    Neurological: Negative for dizziness, tingling, sensory change, speech change, focal weakness, weakness and headaches.      Vital Signs  Temp:  [36.4 °C (97.6 °F)-36.9 °C (98.5 °F)] 36.4 °C (97.6 °F)  Pulse:  [66-83] 82  Resp:  [17-18] 18  BP: (125-133)/(77-80) 126/79  SpO2:  [92 %-96 %] 92 %    Physical Exam  Physical Exam  Constitutional:       General: He is not in acute distress.     Appearance: He is not ill-appearing.      Interventions: Nasal cannula in place.   HENT:      Head: Normocephalic and atraumatic.      Right Ear: External ear normal.      Left Ear: External ear normal.      Nose: Nose normal.      Mouth/Throat:      Mouth: Mucous membranes are moist.      Pharynx: Oropharynx is clear.   Eyes:      Extraocular Movements: Extraocular movements intact.      Pupils: Pupils are equal, round, and reactive to light.   Cardiovascular:      Rate and Rhythm: Normal rate and regular rhythm.      Pulses:  Normal pulses.      Heart sounds: Normal heart sounds.   Pulmonary:      Effort: Pulmonary effort is normal. No respiratory distress.      Breath sounds: Normal breath sounds.   Chest:      Chest wall: No tenderness.   Abdominal:      General: Bowel sounds are normal. There is no distension.      Tenderness: There is no abdominal tenderness.   Musculoskeletal:         General: No swelling, tenderness or deformity.      Cervical back: Normal range of motion and neck supple. No tenderness.      Right lower leg: No edema.      Left lower leg: No edema.   Skin:     General: Skin is warm and dry.      Capillary Refill: Capillary refill takes less than 2 seconds.   Neurological:      General: No focal deficit present.      Mental Status: He is alert and oriented to person, place, and time. Mental status is at baseline.      GCS: GCS eye subscore is 4. GCS verbal subscore is 5. GCS motor subscore is 6.      Sensory: No sensory deficit.      Motor: No weakness.      Comments: 5/5 strength bilateral upper & lower extremities   Psychiatric:         Mood and Affect: Mood normal.         Behavior: Behavior normal.         Laboratory  No results found for this or any previous visit (from the past 24 hour(s)).    Core Measures & Quality Metrics  Radiology images reviewed, Labs reviewed and Medications reviewed  Morocho catheter: No Morocho      DVT Prophylaxis: Not indicated at this time, ambulatory  DVT prophylaxis - mechanical: SCDs      Assessed for rehab: Patient returned to prior level of function, rehabilitation not indicated at this time    RAP Score Total: 5    ETOH Screening  CAGE Score: 0  Assessment complete date: 5/22/2022 (BAL negative & CAGE negative)      Assessment/Plan  * Closed fracture of thoracic spine without spinal cord lesion, initial encounter ([facility])- (present on admission)  Assessment & Plan  Moderate T4 vertebral body fracture with loss of height anteriorly ~ 50%. No retropulsion.  Acute fracture of the  superior endplate of T5 and T8 vertebral body without significant loss of height. No retropulsion.  Nondisplaced acute fracture of the anterior superior corner of the T12 vertebral body. No loss of height or retropulsion.  Non-operative management.   No bracing required.  Sergey Olson MD. Neurosurgeon. [facility] [place].   Follow up with spine surgeon in [place], [place]. Will need repeat xrays in 4-6 weeks.    Contraindication to deep vein thrombosis (DVT) prophylaxis- (present on admission)  Assessment & Plan  Prophylactic anticoagulation for thrombotic prevention initially contraindicated secondary to elevated bleeding risk.  5/22 Trauma surveillance venous duplex scanning ordered.   Ambulate TID.    Encounter for screening for COVID-19- (present on admission)  Assessment & Plan  Fully vaccinated (greater than 2 weeks following the second dose in a 2-dose series or greater than 2 weeks following a single-dose vaccine).    Hypertension- (present on admission)  Assessment & Plan  Chronic condition treated with lisinopril & hydrochlorothiazide.  Resumed maintenance medication on admission.    Trauma- (present on admission)  Assessment & Plan  Bucked off a horse. Positive loss of consciousness.  Trauma Green Transfer Activation.  Ava Martinez MD. Trauma Surgery.       Discussed patient condition with Patient and trauma surgery, Dr Martinez.

## 2022-05-23 NOTE — DISCHARGE SUMMARY
Trauma Discharge Summary    DATE OF ADMISSION: 5/20/2022    DATE OF DISCHARGE: 5/24/2022    LENGTH OF STAY: 4 days    ATTENDING PHYSICIAN: Ava Martinez M.D.    CONSULTING PHYSICIAN:   Carley Olson M.D.    DISCHARGE DIAGNOSIS:  Principal Problem:    Closed fracture of thoracic spine without spinal cord lesion, initial encounter ([facility]) POA: Yes  Active Problems:    Hypertension POA: Yes    Encounter for screening for COVID-19 POA: Yes    Contraindication to deep vein thrombosis (DVT) prophylaxis POA: Yes    Trauma POA: Yes  Resolved Problems:    * No resolved hospital problems. *      PROCEDURES:  1. N/A    HISTORY OF PRESENT ILLNESS: The patient is a 67 y.o. male who was reportedly injured in a when he was thrown from a horse.  The patient had no loss of consciousness.  He was evaluated first at [facility] where he was found to have multiple thoracic fractures.  The patient was transferred to [facility] in [place], [place].    HOSPITAL COURSE: The patient was triaged as a trauma transfer activation. The patient was transported to the ortho/[facility]ine [place].  The patient was found to have T4 vertebral body fracture, T5 and T8 superior endplate fractures and T12 vertebral body fracture.  The patient had a neurosurgical consultation by Dr. Sergey Olson MD.  Patient's injuries were treated nonoperatively.  He requires no bracing.  The patient underwent frequent neurological examination multimodal pain control.  The patient's neurological examinations remained without deficit in the lower extremities throughout his stay.  The patient will follow up with a spine surgeon in his hometown in 4 to 6 weeks for repeat clinical examination and new x-rays.  Sooner if condition worsens.  On the day of discharge patient was afebrile, vital signs stable, ambulating without assistance, pain controlled on current regimen, incentive spirometer 2500 and oxygen saturation greater than 90% on room  air.  The patient desired to be discharged home.  Discharge instructions, follow-up and medications were discussed in detail with the patient.  The patient was then discharged home with his wife in stable condition.      HOSPITAL PROBLEM LIST:  * Closed fracture of thoracic spine without spinal cord lesion, initial encounter ([facility])- (present on admission)  Assessment & Plan  Moderate T4 vertebral body fracture with loss of height anteriorly ~ 50%. No retropulsion.  Acute fracture of the superior endplate of T5 and T8 vertebral body without significant loss of height. No retropulsion.  Nondisplaced acute fracture of the anterior superior corner of the T12 vertebral body. No loss of height or retropulsion.  Non-operative management.   No bracing required.  Sergey Olson MD. Neurosurgeon. [facility] [place].   Follow up with spine surgeon in [place], [place]. Will need repeat xrays in 4-6 weeks.    Contraindication to deep vein thrombosis (DVT) prophylaxis- (present on admission)  Assessment & Plan  Prophylactic anticoagulation for thrombotic prevention initially contraindicated secondary to elevated bleeding risk.  5/22 Trauma surveillance venous duplex scanning ordered.   Ambulate TID.    Encounter for screening for COVID-19- (present on admission)  Assessment & Plan  Fully vaccinated (greater than 2 weeks following the second dose in a 2-dose series or greater than 2 weeks following a single-dose vaccine).    Hypertension- (present on admission)  Assessment & Plan  Chronic condition treated with lisinopril & hydrochlorothiazide.  Resumed maintenance medication on admission.    Trauma- (present on admission)  Assessment & Plan  Bucked off a horse. Positive loss of consciousness.  Trauma Green Transfer Activation.  Ava Martinez MD. Trauma Surgery.         DISPOSITION: Discharged home with his wife on 5/24/2022. The patient and family were counseled and questions were answered. Specifically, signs and symptoms of  infection, respiratory decompensation, neurological decompensation and persistent or worsening pain were discussed and the patient agrees to seek medical attention if any of these develop.    DISCHARGE MEDICATIONS:  The patients controlled substance history was reviewed and a controlled substance use informed consent (if applicable) was provided by [facility] and the patient has been prescribed.     Medication List      START taking these medications      Instructions   acetaminophen 500 MG Tabs  Commonly known as: TYLENOL   Take 2 Tablets by mouth every 8 hours as needed for Mild Pain or Moderate Pain.  Dose: 1,000 mg     celecoxib 100 MG Caps  Commonly known as: CELEBREX   Take 1 Capsule by mouth 2 times a day for 15 days.  Dose: 100 mg     docusate sodium 100 MG Caps   Take 100 mg by mouth 2 times a day as needed for Constipation.  Dose: 100 mg     gabapentin 100 MG Caps  Commonly known as: NEURONTIN   Take 1 Capsule by mouth 3 times a day for 15 days.  Dose: 100 mg     lidocaine 5 % Ptch  Commonly known as: LIDODERM   Place 1 Patch on the skin every 24 hours for 15 days (12 hours on, 12 hours off)  Dose: 1 Patch     oxyCODONE immediate-release 5 MG Tabs  Commonly known as: ROXICODONE   Take 1 Tablet by mouth every four hours as needed for Severe Pain for up to 7 days.  Dose: 5 mg     tizanidine 2 MG tablet  Commonly known as: ZANAFLEX   Take 1 Tablet by mouth every 6 hours as needed (muscle spasm) for up to 10 days.  Dose: 2 mg        CONTINUE taking these medications      Instructions   fosinopril 20 MG Tabs  Commonly known as: MONOPRIL   Take 20 mg by mouth every day.  Dose: 20 mg     hydroCHLOROthiazide 12.5 MG tablet  Commonly known as: HYDRODIURIL   Take 12.5 mg by mouth every day.  Dose: 12.5 mg            ACTIVITY:  As tolerated.  No strenuous activities or contact sports until cleared in follow-up.  No lifting pushing pulling greater than 10 pounds until cleared in follow-up.  No  excessive bending or twisting.    WOUND CARE:  N/A    DIET:  No orders of the defined types were placed in this encounter.      FOLLOW UP:  Ava Martinez M.D.  [address]  [address]  [facility] [id number] [phone]    Call  As needed, If symptoms worsen    Sergey Olson M.D.  [address]  [address]  [facility] [id number] [phone]    Call  As needed, If symptoms worsen    SPINE SURGEON    Schedule an appointment as soon as possible for a visit in 4 week(s)  For repeat exam and new xrays      TIME SPENT ON DISCHARGE: 34 minutes      ____________________________________________  Shanti Brownlee P.A.-C.    DD: 5/23/2022 9:21 AM

## 2022-05-23 NOTE — DISCHARGE PLANNING
Case Management Discharge Planning    Admission Date: 5/20/2022  GMLOS: 2.9  ALOS: 3    6-Clicks ADL Score: 21  6-Clicks Mobility Score: 14  PT and/or OT Eval ordered: Yes  Post-acute Referrals Ordered: No  Post-acute Choice Obtained: No  Has referral(s) been sent to post-acute provider:  No      Anticipated Discharge Dispo: Discharge Disposition: Discharged to home/self care (01)  Discharge Address: [facility] [place] [address]  Discharge Contact Phone Number: [phone]    DME Needed: No    Action(s) Taken: DC Assessment Complete (See below)     PCP Dr. Dinesh Freeman    Escalations Completed: None    Medically Clear: No    Next Steps: LSW met with PT and spouse at bedside. PT alert and Oriented. LSW reviewed and confirmed the demographic information on the face sheet and completed a dc assessment.     Barriers to Discharge: Medical clearance    Is the patient up for discharge tomorrow: No      Care Transition Team Assessment    Information Source  Orientation Level: Oriented X4  Information Given By: Patient  Who is responsible for making decisions for patient? : Patient    Readmission Evaluation  Is this a readmission?: Yes - unplanned readmission  Why do you think you were readmitted?: Admitted due to Horse Accident    Elopement Risk  Legal Hold: No  Ambulatory or Self Mobile in Wheelchair: No-Not an Elopement Risk  Elopement Risk: Not at Risk for Elopement    Interdisciplinary Discharge Planning  Lives with - Patient's Self Care Capacity: Spouse  Patient or legal guardian wants to designate a caregiver: No  Support Systems: Family Member(s)  Housing / Facility: [address]  Prior Services: None  Patient Prefers to be Discharged to:: home  Durable Medical Equipment: Not Applicable    Discharge Preparedness  What is your plan after discharge?: Home with help  What are your discharge supports?: Spouse, Child  Prior Functional Level: Ambulatory  Difficulity with ADLs: None  Difficulity with IADLs:  None    Functional Assesment  Prior Functional Level: Ambulatory    Finances  Financial Barriers to Discharge: No  Prescription Coverage: Yes    Vision / Hearing Impairment  Vision Impairment : No  Hearing Impairment : No         Advance Directive  Advance Directive?: Living Will, DPOA for Health Care  Durable Power of  Name and Contact : Nadine Loco [phone]  Advance Directive offered?: AD Booklet refused    Domestic Abuse  Have you ever been the victim of abuse or violence?: No  Physical Abuse or Sexual Abuse: No  Verbal Abuse or Emotional Abuse: No  Possible Abuse/Neglect Reported to:: Not Applicable    Psychological Assessment  History of Substance Abuse: None    Discharge Risks or Barriers  Discharge risks or barriers?: No    Anticipated Discharge Information  Discharge Disposition: Discharged to home/self care (01)  Discharge Address: [address] [facility] [place] [address]  Discharge Contact Phone Number: [phone]

## 2022-05-23 NOTE — CARE PLAN
The patient is Stable - Low risk of patient condition declining or worsening    Shift Goals  Clinical Goals: Pain Control, Mobility, O2 wean  Patient Goals: Pain Control  Family Goals: pain control, comfort    Progress made toward(s) clinical / shift goals:        Problem: Pain - Standard  Goal: Alleviation of pain or a reduction in pain to the patient’s comfort goal  Note: Pain managed with scheduled meds and PRN oxy10.      Problem: Knowledge Deficit - Standard  Goal: Patient and family/care givers will demonstrate understanding of plan of care, disease process/condition, diagnostic tests and medications  Note: POC discussed with patient and wife at the bedside.      Problem: Fall Risk  Goal: Patient will remain free from falls  Note: Moderate fall risk. Bed alarm in place.         Patient is not progressing towards the following goals:

## 2022-05-23 NOTE — DISCHARGE INSTRUCTIONS
Discharge Instructions          - Call or seek medical attention for questions or concerns  - Please call Dr. Ava Martinez MD as needed and with questions  - Follow up with Spine surgeon in 4-6 weeks for repeat xrays of thoracic spine  - Follow up with primary care provider within one weeks time  - Resume regular diet  - May take over the counter acetaminophen as needed for pain  - Continue daily over the counter stool softener while on narcotics  - No operation of machinery or motorized vehicles while under the influence of narcotics  - No alcohol, marijuana or illicit drug use while under the influence of narcotics  - In the event of a narcotic overdose naloxone (Narcan) is available without a prescription from any [facility] or [facility]  - No swimming, hot tubs, baths or wound submersion until cleared by outpatient provider. May shower  - No contact sports, strenuous activities, or heavy lifting until cleared by outpatient provider  - If respiratory decompensation, persistent or worsening pain, neurological decompensation, or signs or symptoms of infection occur seek medical attention      Discharged to home by car with relative. Discharged via wheelchair, hospital escort: Yes.  Special equipment needed: Not Applicable    Be sure to schedule a follow-up appointment with your primary care doctor or any specialists as instructed.     Discharge Plan:        I understand that a diet low in cholesterol, fat, and sodium is recommended for good health. Unless I have been given specific instructions below for another diet, I accept this instruction as my diet prescription.   Other diet: regular    Special Instructions: None    Is patient discharged on Warfarin / Coumadin?   No     Depression / Suicide Risk    As you are discharged from this [facility] facility, it is important to learn how to keep safe from harming yourself.    Recognize the warning signs:  Abrupt changes in personality, positive or negative-  including increase in energy   Giving away possessions  Change in eating patterns- significant weight changes-  positive or negative  Change in sleeping patterns- unable to sleep or sleeping all the time   Unwillingness or inability to communicate  Depression  Unusual sadness, discouragement and loneliness  Talk of wanting to die  Neglect of personal appearance   Rebelliousness- reckless behavior  Withdrawal from people/activities they love  Confusion- inability to concentrate     If you or a loved one observes any of these behaviors or has concerns about self-harm, here's what you can do:  Talk about it- your feelings and reasons for harming yourself  Remove any means that you might use to hurt yourself (examples: pills, rope, extension cords, firearm)  Get professional help from the community (Mental Health, Substance Abuse, psychological counseling)  Do not be alone:Call your Safe Contact- someone whom you trust who will be there for you.  Call your local CRISIS HOTLINE 056-3002 or 310-216-0942  Call your local Children's Mobile Crisis Response Team [place] (103) 743-7624 or www.Naartjie  Call the toll free National Suicide Prevention Hotlines   National Suicide Prevention Lifeline 609-247-XEQW (7248)  National [place] Line Network 800-SUICIDE (994-1993)  Thoracic Spine Fracture  A thoracic spine fracture is a break in one of the bones of the middle part of the back. Thoracic spine fractures can vary from mild to severe. The most severe types are those that:  Cause the broken bones to move out of place (unstable).  Injure or press on the spinal cord.  In some cases, the bone that connects to the lower part of the back may also have a break (thoracolumbar fracture).  What are the causes?  This condition may be caused by:  A motor vehicle collision.  A fall or jump from a great height.  A collision during sports.  Violent acts, such as an assault or gunshot.  What increases the risk?  You are more likely  to develop this condition if:  You have osteoporosis.  You play contact sports.  You are in a situation that could result in a fall or other violent injury.  What are the signs or symptoms?  Symptoms of this injury depend on the type of fracture. The main symptom of this condition is back pain that gets worse with movement.  Other symptoms include:  Trouble standing or walking.  Numbness.  Tingling.  Weakness.  Loss of movement.  Loss of bowel or bladder control (incontinence).  How is this diagnosed?  This condition may be diagnosed based on:  Your symptoms and medical history.  A physical exam.  Imaging tests, such as:  X-rays.  CT scan.  MRI.  How is this treated?  Treatment for this injury depends on the type of fracture. If your fracture is stable and does not affect your spinal cord, it may heal with treatments such as:  Medicines for pain.  A cast or a brace.  Physical therapy.  If your fracture is unstable or if it affects your spinal cord, you may need surgery.  Follow these instructions at home:  Medicines  Take over-the-counter and prescription medicines only as told by your health care provider.  Do not drive or use heavy machinery while taking pain medicine.  To prevent or treat constipation while you are taking prescription pain medicine, your health care provider may recommend that you:  Drink enough fluid to keep your urine pale yellow.  Take over-the-counter or prescription medicines.  Eat foods that are high in fiber, such as fresh fruits and vegetables, whole grains, and beans.  Limit foods that are high in fat and processed sugars, such as fried or sweet foods.  If you have a brace:  Wear the brace as told by your health care provider. Remove it only as told by your health care provider.  Keep the brace clean.  If the brace is not waterproof:  Do not let it get wet.  Cover it with a watertight covering when you take a bath or a shower.  Activity  Stay in bed (on bed rest) only as directed by your  health care provider. Being on bed rest for too long can make your condition worse.  Return to your normal activities as directed by your health care provider. Ask what activities are safe for you.  Do exercises to improve motion and strength in your back (physical therapy), as recommended by your health care provider.  Exercise regularly as told by your health care provider.  Managing pain, stiffness, and swelling    If directed, apply ice to the injured area:  Put ice in a plastic bag.  Place a towel between your skin and the bag.  Leave the ice on for 20 minutes, 2-3 times a day.  General instructions  Do not use any products that contain nicotine or tobacco, such as cigarettes and e-cigarettes. These can delay healing after injury. If you need help quitting, ask your health care provider.  Do not drink alcohol. Alcohol can interfere with your treatment.  Keep all follow-up visits as directed by your health care provider. This is important. It can help to prevent permanent injury, disability, and long-lasting (chronic) pain.  Contact a health care provider if:  You have a fever.  You develop a cough that makes your pain worse.  Your pain medicine is not helping.  Your pain does not get better over time.  You cannot return to your normal activities as planned or expected.  Get help right away if:  Your pain is very bad and it suddenly gets worse.  You are unable to move any part of your body (paralysis) that is below the level of your injury.  You have numbness, tingling, or weakness in any part of your body that is below the level of your injury.  You cannot control your bladder or bowels.  Summary  A thoracic spine fracture is a break in one of the bones of the middle part of the back.  Treatment for this injury depends on the type of fracture.  A stable fracture can be treated with a back brace, activity restrictions, pain medicine, and physical therapy. A more severe break may require surgery.  Make sure you  know what symptoms should cause you to get help right away.  This information is not intended to replace advice given to you by your health care provider. Make sure you discuss any questions you have with your health care provider.  Document Released: 01/25/2006 Document Revised: 02/01/2019 Document Reviewed: 02/01/2019  Elsevier Patient Education © 2020 [employer]

## 2022-05-24 ENCOUNTER — APPOINTMENT (OUTPATIENT)
Dept: RADIOLOGY | Facility: MEDICAL CENTER | Age: 67
DRG: 552 | End: 2022-05-24
Attending: NURSE PRACTITIONER
Payer: MEDICARE

## 2022-05-24 ENCOUNTER — PHARMACY VISIT (OUTPATIENT)
Dept: PHARMACY | Facility: MEDICAL CENTER | Age: 67
End: 2022-05-24
Payer: COMMERCIAL

## 2022-05-24 VITALS
OXYGEN SATURATION: 93 % | BODY MASS INDEX: 23.86 KG/M2 | HEIGHT: 73 IN | RESPIRATION RATE: 18 BRPM | TEMPERATURE: 97 F | HEART RATE: 63 BPM | SYSTOLIC BLOOD PRESSURE: 131 MMHG | WEIGHT: 180 LBS | DIASTOLIC BLOOD PRESSURE: 68 MMHG

## 2022-05-24 PROCEDURE — A9270 NON-COVERED ITEM OR SERVICE: HCPCS

## 2022-05-24 PROCEDURE — A9270 NON-COVERED ITEM OR SERVICE: HCPCS | Performed by: PHYSICIAN ASSISTANT

## 2022-05-24 PROCEDURE — 700102 HCHG RX REV CODE 250 W/ 637 OVERRIDE(OP): Performed by: SURGERY

## 2022-05-24 PROCEDURE — 700102 HCHG RX REV CODE 250 W/ 637 OVERRIDE(OP): Performed by: PHYSICIAN ASSISTANT

## 2022-05-24 PROCEDURE — RXMED WILLOW AMBULATORY MEDICATION CHARGE: Performed by: PHYSICIAN ASSISTANT

## 2022-05-24 PROCEDURE — A9270 NON-COVERED ITEM OR SERVICE: HCPCS | Performed by: SURGERY

## 2022-05-24 PROCEDURE — 700102 HCHG RX REV CODE 250 W/ 637 OVERRIDE(OP)

## 2022-05-24 PROCEDURE — 94760 N-INVAS EAR/PLS OXIMETRY 1: CPT

## 2022-05-24 PROCEDURE — 700101 HCHG RX REV CODE 250

## 2022-05-24 PROCEDURE — 99239 HOSP IP/OBS DSCHRG MGMT >30: CPT | Performed by: PHYSICIAN ASSISTANT

## 2022-05-24 RX ORDER — CELECOXIB 100 MG/1
100 CAPSULE ORAL 2 TIMES DAILY
Qty: 30 CAPSULE | Refills: 0 | Status: SHIPPED | OUTPATIENT
Start: 2022-05-24 | End: 2022-06-08

## 2022-05-24 RX ADMIN — ACETAMINOPHEN 1000 MG: 500 TABLET ORAL at 05:46

## 2022-05-24 RX ADMIN — LISINOPRIL 20 MG: 20 TABLET ORAL at 05:46

## 2022-05-24 RX ADMIN — OXYCODONE HYDROCHLORIDE 10 MG: 10 TABLET ORAL at 06:53

## 2022-05-24 RX ADMIN — LIDOCAINE 1 PATCH: 50 PATCH TOPICAL at 11:08

## 2022-05-24 RX ADMIN — CELECOXIB 100 MG: 100 CAPSULE ORAL at 05:46

## 2022-05-24 RX ADMIN — METHOCARBAMOL 1000 MG: 500 TABLET ORAL at 11:07

## 2022-05-24 RX ADMIN — GABAPENTIN 100 MG: 100 CAPSULE ORAL at 05:46

## 2022-05-24 RX ADMIN — GABAPENTIN 100 MG: 100 CAPSULE ORAL at 11:07

## 2022-05-24 RX ADMIN — OXYCODONE HYDROCHLORIDE 10 MG: 10 TABLET ORAL at 02:51

## 2022-05-24 RX ADMIN — DOCUSATE SODIUM 100 MG: 100 CAPSULE, LIQUID FILLED ORAL at 05:46

## 2022-05-24 RX ADMIN — METHOCARBAMOL 1000 MG: 500 TABLET ORAL at 05:45

## 2022-05-24 RX ADMIN — HYDROCHLOROTHIAZIDE 12.5 MG: 12.5 TABLET ORAL at 05:45

## 2022-05-24 RX ADMIN — POLYETHYLENE GLYCOL 3350 1 PACKET: 17 POWDER, FOR SOLUTION ORAL at 05:45

## 2022-05-24 RX ADMIN — OXYCODONE HYDROCHLORIDE 10 MG: 10 TABLET ORAL at 11:07

## 2022-05-24 ASSESSMENT — PAIN DESCRIPTION - PAIN TYPE
TYPE: ACUTE PAIN

## 2022-05-24 NOTE — DISCHARGE PLANNING
Received Choice form at [phone]  Agency/Facility Name: [facility]  Referral sent per Choice form @ 3803    SIMON reilly.

## 2022-05-24 NOTE — DISCHARGE PLANNING
Patient does not qualify for home oxygen as ambulatory sats 94%. Met with patient and his wife at bedside, explained criteria for home oxygen. Patient will follow up with his PCP upon his return home to [facility].

## 2022-05-24 NOTE — FACE TO FACE
"Face to Face Note  -  Durable Medical Equipment    Shanti Brownlee P.A.-C. - NPI: [id number]  I certify that this patient is under my care and that they had a durable medical equipment(DME)face to face encounter by myself that meets the physician DME face-to-face encounter requirements with this patient on:    Date of encounter:   Patient:                    MRN:                       YOB: 2023  Kleber Loco  [id number]  1955     The encounter with the patient was in whole, or in part, for the following medical condition, which is the primary reason for durable medical equipment:  Other - Hypoxia while sleeping    I certify that, based on my findings, the following durable medical equipment is medically necessary:  Oxygen.    HOME O2 Saturation Measurements:(Values must be present for Home Oxygen orders)  Room air sat at rest: 95  Room air sat with amb: 94  With liters of O2: 0, O2 sat at rest with O2:  (oxygen not needed on room air)  With Liters of O2: 0, O2 sat with amb with O2 :  (room air. oxygen not needed)  Is the patient mobile?: Yes    My Clinical findings support the need for the above equipment due to:  Hypoxia    Supporting Symptoms: The patient requires supplemental oxygen, as the following interventions have been tried with limited or no improvement: \"Incentive spirometry    If patient feels more short of breath, they can go up to 6 liters per minute and contact healthcare provider.  "

## 2022-05-24 NOTE — DISCHARGE PLANNING
Case Management Discharge Planning    Admission Date: 5/20/2022  GMLOS: 2.9  ALOS: 4    6-Clicks ADL Score: 21  6-Clicks Mobility Score: 14  PT and/or OT Eval ordered: Yes  Post-acute Referrals Ordered: No  Post-acute Choice Obtained: No  Has referral(s) been sent to post-acute provider:  NA      Anticipated Discharge Dispo: Discharge Disposition: Discharged to home/self care (01)  Discharge Address: [facility] [address] [address]  Discharge Contact Phone Number: [phone]    DME Needed: Yes    DME Ordered: Yes    Action(s) Taken: Choice obtained and Referral(s) sent.    LSW met with PT and Spouse at bedside. Pt was alert and oriented. PT was given choice for DME, IMM was delivered and signed and HH was discussed. PT was informed that he will need to contact his PCP in regards to HH because he will be returning to his home in [place], [place] in two days. PT and Spouse stated a PCP appointment has been scheduled.     Escalations Completed: None    Medically Clear: Yes    Next Steps: LSW will follow up regarding the DME order and any additional discharge needs,     Barriers to Discharge: DME    Is the patient up for discharge tomorrow: No

## 2022-05-24 NOTE — CARE PLAN
Problem: Pain - Standard  Goal: Alleviation of pain or a reduction in pain to the patient’s comfort goal  Outcome: Progressing   Pt verbalizes pain control at this time.  Problem: Knowledge Deficit - Standard  Goal: Patient and family/care givers will demonstrate understanding of plan of care, disease process/condition, diagnostic tests and medications  Outcome: Progressing     Problem: Fall Risk  Goal: Patient will remain free from falls  Outcome: Progressing   The patient is Stable - Low risk of patient condition declining or worsening    Shift Goals  Clinical Goals: Pain Control, Mobility, O2 wean  Patient Goals: Pain Control  Family Goals: pain control, comfort    Progress made toward(s) clinical / shift goals:  Pt verbalizes pain control at this time. Pt educated on fall precautions and possible discharge home today.    Patient is not progressing towards the following goals:

## 2022-05-24 NOTE — PROGRESS NOTES
" Pt A&O4, RA. Pt ambulating around the unit with one person assist, no DME. Pt tolerating ambulation without difficulty. Pt verbalizes pain  Control at this time. Pt concerned regarding discharging home due to waking up in the middle of the night \"needing air\". Pt does not require oxygen during my shift. Pt on RA at rest and ambulating with saturation >90%. Per case management, pt's insurance does not cover night time oxygen if patient does not requires it during the day. Pt informed regarding information by case management and verbalizes understanding. Per patient, he would like to discharge home regardless of not having the oxygen for night time. Trauma, PA informed regarding patient's request.     [facility] Pt transferred via WC with wife and staff. Pt agrees with discharge. Meds to bed at pharmacy waiting to be picked up. Discharge RN informed regarding meds to bed. All personal belongings taken by patient.  "

## 2022-05-24 NOTE — PROGRESS NOTES
"/68   Pulse 63   Temp 36.1 °C (97 °F) (Temporal)   Resp 18   Ht 1.854 m (6' 1\")   Wt 81.6 kg (180 lb)   SpO2 92%   BMI 23.75 kg/m²       Patient seen and examined.  Patient has hypoxia when sleeping and feels SOB upon waking.  Pain controlled on current regimen.  Otherwise ready for discharge home..    - Home o2 ordered  - Anticipate discharge home with family when o2 arranged, hopefully today  - Discussed with patient, family, RN and CM    "

## 2022-05-24 NOTE — CARE PLAN
The patient is Stable - Low risk of patient condition declining or worsening    Shift Goals  Clinical Goals: Pain Control, Mobility, O2 wean  Patient Goals: Pain Control  Family Goals: pain control, comfort    Progress made toward(s) clinical / shift goals:        Problem: Pain - Standard  Goal: Alleviation of pain or a reduction in pain to the patient’s comfort goal  Note: Pain managed with PRN oxy and scheduled robaxin      Problem: Knowledge Deficit - Standard  Goal: Patient and family/care givers will demonstrate understanding of plan of care, disease process/condition, diagnostic tests and medications  Note: POC discussed with wife and patient at bedside. Encouraged use of IS.        Patient is not progressing towards the following goals:

## 2022-05-24 NOTE — PROGRESS NOTES
Pt being dc'd to home with no needs. Pt discharge medications via meds to beds. IV dc'd. Dc instructions discussed with patient in discharge lounge. All questions answered.  Patient and spouse agreeable to dc plan.  Bedside RN to confirm that patient has all belongings at dc and that all home care needs have been arranged.